# Patient Record
Sex: FEMALE | Race: ASIAN | NOT HISPANIC OR LATINO | URBAN - METROPOLITAN AREA
[De-identification: names, ages, dates, MRNs, and addresses within clinical notes are randomized per-mention and may not be internally consistent; named-entity substitution may affect disease eponyms.]

---

## 2019-06-20 ENCOUNTER — EMERGENCY (EMERGENCY)
Facility: HOSPITAL | Age: 29
LOS: 1 days | Discharge: HOME | End: 2019-06-20
Attending: EMERGENCY MEDICINE
Payer: MEDICAID

## 2019-06-20 VITALS
HEIGHT: 58 IN | DIASTOLIC BLOOD PRESSURE: 88 MMHG | TEMPERATURE: 98 F | HEART RATE: 77 BPM | SYSTOLIC BLOOD PRESSURE: 124 MMHG | RESPIRATION RATE: 16 BRPM | WEIGHT: 153 LBS | OXYGEN SATURATION: 100 %

## 2019-06-20 VITALS
OXYGEN SATURATION: 100 % | DIASTOLIC BLOOD PRESSURE: 88 MMHG | RESPIRATION RATE: 16 BRPM | TEMPERATURE: 97 F | HEART RATE: 67 BPM | SYSTOLIC BLOOD PRESSURE: 134 MMHG

## 2019-06-20 LAB
ALBUMIN SERPL ELPH-MCNC: 4 G/DL — SIGNIFICANT CHANGE UP (ref 3.5–5)
ALP SERPL-CCNC: 74 U/L — SIGNIFICANT CHANGE UP (ref 40–120)
ALT FLD-CCNC: 18 U/L DA — SIGNIFICANT CHANGE UP (ref 10–60)
ANION GAP SERPL CALC-SCNC: 8 MMOL/L — SIGNIFICANT CHANGE UP (ref 5–17)
AST SERPL-CCNC: 10 U/L — SIGNIFICANT CHANGE UP (ref 10–40)
BASOPHILS # BLD AUTO: 0.06 K/UL — SIGNIFICANT CHANGE UP (ref 0–0.2)
BASOPHILS NFR BLD AUTO: 0.6 % — SIGNIFICANT CHANGE UP (ref 0–2)
BILIRUB SERPL-MCNC: 0.6 MG/DL — SIGNIFICANT CHANGE UP (ref 0.2–1.2)
BUN SERPL-MCNC: 9 MG/DL — SIGNIFICANT CHANGE UP (ref 7–18)
CALCIUM SERPL-MCNC: 8.9 MG/DL — SIGNIFICANT CHANGE UP (ref 8.4–10.5)
CHLORIDE SERPL-SCNC: 106 MMOL/L — SIGNIFICANT CHANGE UP (ref 96–108)
CO2 SERPL-SCNC: 26 MMOL/L — SIGNIFICANT CHANGE UP (ref 22–31)
CREAT SERPL-MCNC: 0.61 MG/DL — SIGNIFICANT CHANGE UP (ref 0.5–1.3)
EOSINOPHIL # BLD AUTO: 0.11 K/UL — SIGNIFICANT CHANGE UP (ref 0–0.5)
EOSINOPHIL NFR BLD AUTO: 1.2 % — SIGNIFICANT CHANGE UP (ref 0–6)
GLUCOSE SERPL-MCNC: 91 MG/DL — SIGNIFICANT CHANGE UP (ref 70–99)
HCG SERPL-ACNC: <1 MIU/ML — SIGNIFICANT CHANGE UP
HCT VFR BLD CALC: 35.9 % — SIGNIFICANT CHANGE UP (ref 34.5–45)
HGB BLD-MCNC: 10.9 G/DL — LOW (ref 11.5–15.5)
IMM GRANULOCYTES NFR BLD AUTO: 0.4 % — SIGNIFICANT CHANGE UP (ref 0–1.5)
LYMPHOCYTES # BLD AUTO: 2.56 K/UL — SIGNIFICANT CHANGE UP (ref 1–3.3)
LYMPHOCYTES # BLD AUTO: 27.1 % — SIGNIFICANT CHANGE UP (ref 13–44)
MCHC RBC-ENTMCNC: 18.8 PG — LOW (ref 27–34)
MCHC RBC-ENTMCNC: 30.4 GM/DL — LOW (ref 32–36)
MCV RBC AUTO: 62 FL — LOW (ref 80–100)
MONOCYTES # BLD AUTO: 0.51 K/UL — SIGNIFICANT CHANGE UP (ref 0–0.9)
MONOCYTES NFR BLD AUTO: 5.4 % — SIGNIFICANT CHANGE UP (ref 2–14)
NEUTROPHILS # BLD AUTO: 6.16 K/UL — SIGNIFICANT CHANGE UP (ref 1.8–7.4)
NEUTROPHILS NFR BLD AUTO: 65.3 % — SIGNIFICANT CHANGE UP (ref 43–77)
NRBC # BLD: 0 /100 WBCS — SIGNIFICANT CHANGE UP (ref 0–0)
PLATELET # BLD AUTO: 482 K/UL — HIGH (ref 150–400)
POTASSIUM SERPL-MCNC: 3.3 MMOL/L — LOW (ref 3.5–5.3)
POTASSIUM SERPL-SCNC: 3.3 MMOL/L — LOW (ref 3.5–5.3)
PROT SERPL-MCNC: 8 G/DL — SIGNIFICANT CHANGE UP (ref 6–8.3)
RBC # BLD: 5.79 M/UL — HIGH (ref 3.8–5.2)
RBC # FLD: 17 % — HIGH (ref 10.3–14.5)
SODIUM SERPL-SCNC: 140 MMOL/L — SIGNIFICANT CHANGE UP (ref 135–145)
TROPONIN I SERPL-MCNC: <0.015 NG/ML — SIGNIFICANT CHANGE UP (ref 0–0.04)
WBC # BLD: 9.44 K/UL — SIGNIFICANT CHANGE UP (ref 3.8–10.5)
WBC # FLD AUTO: 9.44 K/UL — SIGNIFICANT CHANGE UP (ref 3.8–10.5)

## 2019-06-20 PROCEDURE — 85027 COMPLETE CBC AUTOMATED: CPT

## 2019-06-20 PROCEDURE — 80053 COMPREHEN METABOLIC PANEL: CPT

## 2019-06-20 PROCEDURE — 36415 COLL VENOUS BLD VENIPUNCTURE: CPT

## 2019-06-20 PROCEDURE — 84484 ASSAY OF TROPONIN QUANT: CPT

## 2019-06-20 PROCEDURE — 96374 THER/PROPH/DIAG INJ IV PUSH: CPT

## 2019-06-20 PROCEDURE — 71045 X-RAY EXAM CHEST 1 VIEW: CPT

## 2019-06-20 PROCEDURE — 99285 EMERGENCY DEPT VISIT HI MDM: CPT

## 2019-06-20 PROCEDURE — 99284 EMERGENCY DEPT VISIT MOD MDM: CPT | Mod: 25

## 2019-06-20 PROCEDURE — 71045 X-RAY EXAM CHEST 1 VIEW: CPT | Mod: 26

## 2019-06-20 PROCEDURE — 84702 CHORIONIC GONADOTROPIN TEST: CPT

## 2019-06-20 RX ORDER — METOCLOPRAMIDE HCL 10 MG
10 TABLET ORAL ONCE
Refills: 0 | Status: COMPLETED | OUTPATIENT
Start: 2019-06-20 | End: 2019-06-20

## 2019-06-20 RX ADMIN — Medication 10 MILLIGRAM(S): at 13:44

## 2019-06-20 NOTE — ED PROVIDER NOTE - CARE PROVIDER_API CALL
Jael Stone (MD)  Clinical Neurophysiology; Neurology  [address] [facility], [address] [place] [address]  Phone: [phone]  Fax: [phone]  Follow Up Time: Routine

## 2019-06-20 NOTE — ED PROVIDER NOTE - CARE PLAN
Principal Discharge DX:	Nonintractable episodic headache, unspecified headache type  Secondary Diagnosis:	Beta thalassemia trait  Secondary Diagnosis:	Anxiety about health

## 2019-06-20 NOTE — ED PROVIDER NOTE - OBJECTIVE STATEMENT
Pt is a 27 y/o F presenting to the ED with c/o multiple medical complaints. Pt reports waking up with a headache located at the top of her head. She states it feels like the typical headaches she gets intermittently, but she is still feeling them currently. Pt also states she gradually developed a heaviness in her chest, feeling like she is unable to catch a full breath, and had a tingling in her face. She reports all her sxs were fairly persistent prompting her visit to [facility] urgent care. Had a full work up including lab work and CXR which was negative, and pt sent to the ED for further eval. Denies recent fevers. No head trauma or falls. Pt admits to being under significant amounts of stress over the last few months with her daughter having sepsis 2 months ago and her sister was recently diagnosed with leukemia in the last week.

## 2019-06-20 NOTE — ED ADULT TRIAGE NOTE - CHIEF COMPLAINT QUOTE
BIBA from  for SOB and numbness of b/l upper and lower extremities s/p receiving news of sister having CA. 20G left arm from .

## 2019-06-20 NOTE — ED PROVIDER NOTE - CLINICAL SUMMARY MEDICAL DECISION MAKING FREE TEXT BOX
Reviewed labs from OP clinic that were normal. Will do CXR, EKG, and symptomatic treatment. Symptoms not consistent with SAH or meningitis. Pt has not seen neurology, will refer.

## 2019-06-20 NOTE — ED ADULT NURSE NOTE - OBJECTIVE STATEMENT
Patient came to the ED a/o x 3 ambulates c/o shortness of breath with no respiratory distress. Pt sent from urgent care for evaluation.

## 2019-08-15 NOTE — ED PROVIDER NOTE - PROGRESS NOTE DETAILS
reviewed results w pt, feeling better - headache gone, requesting discharge, advised return precautions. unable to assess. Pt A&Ox1

## 2022-03-04 NOTE — ED PROVIDER NOTE - ATTESTATION, MLM
I have reviewed and confirmed nurses' notes for patient's medications, allergies, medical history, and surgical history.
Esther

## 2022-04-27 ENCOUNTER — APPOINTMENT (OUTPATIENT)
Dept: OBGYN | Facility: CLINIC | Age: 32
End: 2022-04-27

## 2022-08-24 ENCOUNTER — OUTPATIENT (OUTPATIENT)
Dept: OUTPATIENT SERVICES | Facility: HOSPITAL | Age: 32
LOS: 1 days | End: 2022-08-24
Payer: MEDICAID

## 2022-08-24 DIAGNOSIS — Z3A.00 WEEKS OF GESTATION OF PREGNANCY NOT SPECIFIED: ICD-10-CM

## 2022-08-24 DIAGNOSIS — O26.899 OTHER SPECIFIED PREGNANCY RELATED CONDITIONS, UNSPECIFIED TRIMESTER: ICD-10-CM

## 2022-08-24 LAB
APPEARANCE UR: CLEAR — SIGNIFICANT CHANGE UP
BILIRUB UR-MCNC: NEGATIVE — SIGNIFICANT CHANGE UP
COLOR SPEC: YELLOW — SIGNIFICANT CHANGE UP
DIFF PNL FLD: NEGATIVE — SIGNIFICANT CHANGE UP
GLUCOSE UR QL: NEGATIVE — SIGNIFICANT CHANGE UP
KETONES UR-MCNC: NEGATIVE — SIGNIFICANT CHANGE UP
LEUKOCYTE ESTERASE UR-ACNC: ABNORMAL
NITRITE UR-MCNC: NEGATIVE — SIGNIFICANT CHANGE UP
PH UR: 7 — SIGNIFICANT CHANGE UP (ref 5–8)
PROT UR-MCNC: NEGATIVE — SIGNIFICANT CHANGE UP
SP GR SPEC: 1.01 — SIGNIFICANT CHANGE UP (ref 1.01–1.02)
UROBILINOGEN FLD QL: NEGATIVE — SIGNIFICANT CHANGE UP

## 2022-08-24 PROCEDURE — 59025 FETAL NON-STRESS TEST: CPT | Mod: 26

## 2022-08-24 PROCEDURE — 59025 FETAL NON-STRESS TEST: CPT

## 2022-08-24 PROCEDURE — 81001 URINALYSIS AUTO W/SCOPE: CPT

## 2022-08-24 PROCEDURE — 87086 URINE CULTURE/COLONY COUNT: CPT

## 2022-08-24 PROCEDURE — G0463: CPT

## 2022-08-24 RX ORDER — FAMOTIDINE 10 MG/ML
1 INJECTION INTRAVENOUS
Qty: 30 | Refills: 0
Start: 2022-08-24

## 2022-08-24 RX ORDER — INDOMETHACIN 50 MG
50 CAPSULE ORAL ONCE
Refills: 0 | Status: DISCONTINUED | OUTPATIENT
Start: 2022-08-24 | End: 2022-09-07

## 2022-08-24 RX ORDER — INDOMETHACIN 50 MG
1 CAPSULE ORAL
Qty: 12 | Refills: 0
Start: 2022-08-24 | End: 2022-08-26

## 2022-08-24 RX ORDER — FAMOTIDINE 10 MG/ML
20 INJECTION INTRAVENOUS DAILY
Refills: 0 | Status: DISCONTINUED | OUTPATIENT
Start: 2022-08-24 | End: 2022-09-07

## 2022-08-24 RX ORDER — ACETAMINOPHEN 500 MG
975 TABLET ORAL ONCE
Refills: 0 | Status: DISCONTINUED | OUTPATIENT
Start: 2022-08-24 | End: 2022-09-07

## 2022-08-25 LAB
CULTURE RESULTS: SIGNIFICANT CHANGE UP
SPECIMEN SOURCE: SIGNIFICANT CHANGE UP

## 2022-08-29 PROBLEM — Z00.00 ENCOUNTER FOR PREVENTIVE HEALTH EXAMINATION: Status: ACTIVE | Noted: 2022-08-29

## 2022-09-01 ENCOUNTER — APPOINTMENT (OUTPATIENT)
Dept: ANTEPARTUM | Facility: CLINIC | Age: 32
End: 2022-09-01

## 2022-09-01 ENCOUNTER — APPOINTMENT (OUTPATIENT)
Dept: MATERNAL FETAL MEDICINE | Facility: CLINIC | Age: 32
End: 2022-09-01

## 2022-09-01 ENCOUNTER — ASOB RESULT (OUTPATIENT)
Age: 32
End: 2022-09-01

## 2022-09-01 VITALS
RESPIRATION RATE: 16 BRPM | BODY MASS INDEX: 31.91 KG/M2 | DIASTOLIC BLOOD PRESSURE: 74 MMHG | HEART RATE: 90 BPM | HEIGHT: 58 IN | SYSTOLIC BLOOD PRESSURE: 128 MMHG | WEIGHT: 152 LBS | OXYGEN SATURATION: 98 %

## 2022-09-01 DIAGNOSIS — Z78.9 OTHER SPECIFIED HEALTH STATUS: ICD-10-CM

## 2022-09-01 DIAGNOSIS — O47.00 FALSE LABOR BEFORE 37 COMPLETED WEEKS OF GESTATION, UNSPECIFIED TRIMESTER: ICD-10-CM

## 2022-09-01 DIAGNOSIS — D21.9 BENIGN NEOPLASM OF CONNECTIVE AND OTHER SOFT TISSUE, UNSPECIFIED: ICD-10-CM

## 2022-09-01 DIAGNOSIS — O99.012 ANEMIA COMPLICATING PREGNANCY, SECOND TRIMESTER: ICD-10-CM

## 2022-09-01 DIAGNOSIS — Z83.49 FAMILY HISTORY OF OTHER ENDOCRINE, NUTRITIONAL AND METABOLIC DISEASES: ICD-10-CM

## 2022-09-01 DIAGNOSIS — E03.9 ENDOCRINE, NUTRITIONAL AND METABOLIC DISEASES COMPLICATING PREGNANCY, SECOND TRIMESTER: ICD-10-CM

## 2022-09-01 DIAGNOSIS — Z82.49 FAMILY HISTORY OF ISCHEMIC HEART DISEASE AND OTHER DISEASES OF THE CIRCULATORY SYSTEM: ICD-10-CM

## 2022-09-01 DIAGNOSIS — O99.282 ENDOCRINE, NUTRITIONAL AND METABOLIC DISEASES COMPLICATING PREGNANCY, SECOND TRIMESTER: ICD-10-CM

## 2022-09-01 DIAGNOSIS — Z86.39 PERSONAL HISTORY OF OTHER ENDOCRINE, NUTRITIONAL AND METABOLIC DISEASE: ICD-10-CM

## 2022-09-01 DIAGNOSIS — Z83.3 FAMILY HISTORY OF DIABETES MELLITUS: ICD-10-CM

## 2022-09-01 DIAGNOSIS — D56.3 THALASSEMIA MINOR: ICD-10-CM

## 2022-09-01 PROCEDURE — 76817 TRANSVAGINAL US OBSTETRIC: CPT

## 2022-09-01 PROCEDURE — 99204 OFFICE O/P NEW MOD 45 MIN: CPT | Mod: TH

## 2022-09-01 RX ORDER — FERROUS SULFATE 325(65) MG
325 (65 FE) TABLET ORAL TWICE DAILY
Refills: 0 | Status: ACTIVE | COMMUNITY

## 2022-09-01 RX ORDER — LEVOTHYROXINE SODIUM 0.05 MG/1
50 TABLET ORAL
Refills: 0 | Status: ACTIVE | COMMUNITY

## 2022-09-01 NOTE — DISCUSSION/SUMMARY
Mom notified of above and agrees with plan. Will call in 2 to 3 weeks with an update. Please send to pharmacy. [FreeTextEntry1] : COntinue PO Hydration\par \par Growth sonogram in 2 weeks, then every 4 weeks. \par \par Weekly fetal testing to begin at 34-36 weeks.

## 2022-09-01 NOTE — DATA REVIEWED
[FreeTextEntry1] : Sonogram today shows good fetal movement with the cervix apearing long and closed measuring 4.8cm\par \par She was recently evaluated in the hospital for  contractions, however from her hisotry it may be more consistent with a degenerating myoma, as it responded well to a short course of indocin.  Since then, she has not had any recurrence of the pains. \par \par Her home blood pressure reading have remained stable at the 110-120/70's range and she denies any headache or visual changes. BP here today is 128/74\par \par We discussed the need for serial growth sonogram due to increased incidence of IUGR with chronic hypertension.

## 2022-09-16 ENCOUNTER — ASOB RESULT (OUTPATIENT)
Age: 32
End: 2022-09-16

## 2022-09-16 ENCOUNTER — APPOINTMENT (OUTPATIENT)
Dept: ANTEPARTUM | Facility: CLINIC | Age: 32
End: 2022-09-16

## 2022-09-16 PROCEDURE — 76816 OB US FOLLOW-UP PER FETUS: CPT

## 2022-10-11 ENCOUNTER — OUTPATIENT (OUTPATIENT)
Dept: INPATIENT UNIT | Facility: HOSPITAL | Age: 32
LOS: 1 days | Discharge: HOME | End: 2022-10-11

## 2022-10-11 ENCOUNTER — TRANSCRIPTION ENCOUNTER (OUTPATIENT)
Age: 32
End: 2022-10-11

## 2022-10-11 VITALS
TEMPERATURE: 98 F | DIASTOLIC BLOOD PRESSURE: 71 MMHG | HEART RATE: 86 BPM | RESPIRATION RATE: 14 BRPM | SYSTOLIC BLOOD PRESSURE: 98 MMHG

## 2022-10-11 VITALS — DIASTOLIC BLOOD PRESSURE: 79 MMHG | HEART RATE: 77 BPM | SYSTOLIC BLOOD PRESSURE: 124 MMHG

## 2022-10-11 DIAGNOSIS — Z3A.00 WEEKS OF GESTATION OF PREGNANCY NOT SPECIFIED: ICD-10-CM

## 2022-10-11 DIAGNOSIS — O26.899 OTHER SPECIFIED PREGNANCY RELATED CONDITIONS, UNSPECIFIED TRIMESTER: ICD-10-CM

## 2022-10-11 LAB
APPEARANCE UR: ABNORMAL
BACTERIA # UR AUTO: NEGATIVE — SIGNIFICANT CHANGE UP
BILIRUB UR-MCNC: NEGATIVE — SIGNIFICANT CHANGE UP
COLOR SPEC: YELLOW — SIGNIFICANT CHANGE UP
DIFF PNL FLD: NEGATIVE — SIGNIFICANT CHANGE UP
EPI CELLS # UR: 2 /HPF — SIGNIFICANT CHANGE UP (ref 0–5)
GLUCOSE UR QL: NEGATIVE — SIGNIFICANT CHANGE UP
HYALINE CASTS # UR AUTO: 1 /LPF — SIGNIFICANT CHANGE UP (ref 0–7)
KETONES UR-MCNC: NEGATIVE — SIGNIFICANT CHANGE UP
LEUKOCYTE ESTERASE UR-ACNC: NEGATIVE — SIGNIFICANT CHANGE UP
NITRITE UR-MCNC: NEGATIVE — SIGNIFICANT CHANGE UP
PH UR: 8 — SIGNIFICANT CHANGE UP (ref 5–8)
PROT UR-MCNC: ABNORMAL
RBC CASTS # UR COMP ASSIST: 3 /HPF — SIGNIFICANT CHANGE UP (ref 0–4)
SP GR SPEC: 1.02 — SIGNIFICANT CHANGE UP (ref 1.01–1.05)
UROBILINOGEN FLD QL: SIGNIFICANT CHANGE UP
WBC UR QL: 5 /HPF — SIGNIFICANT CHANGE UP (ref 0–5)

## 2022-10-11 PROCEDURE — 76818 FETAL BIOPHYS PROFILE W/NST: CPT | Mod: 26

## 2022-10-11 PROCEDURE — 76817 TRANSVAGINAL US OBSTETRIC: CPT | Mod: 26

## 2022-10-11 RX ORDER — INDOMETHACIN 50 MG
1 CAPSULE ORAL
Qty: 8 | Refills: 0
Start: 2022-10-11 | End: 2022-10-12

## 2022-10-11 RX ORDER — INDOMETHACIN 50 MG
25 CAPSULE ORAL ONCE
Refills: 0 | Status: COMPLETED | OUTPATIENT
Start: 2022-10-11 | End: 2022-10-11

## 2022-10-11 RX ADMIN — Medication 25 MILLIGRAM(S): at 14:08

## 2022-10-11 NOTE — CONSULT NOTE ADULT - SUBJECTIVE AND OBJECTIVE BOX
[facility] Note    Ms. Shepard was seen and evaluated at bedside. She is feeling overall well. She has cramping abdominal pain which is consistent with her previously experienced fibroid pain. SHe rates it has a 4/10 and states that it wraps around to her back. She denies any vaginal bleeding, loss of fluid or decreased fetal movement. She has had increased pelvic pressure throughout the past 2 weeks, worse with prolonged standing. No fevers, chills, chest pain, shortness of breath, dizziness or lightheadedness. No Constipation, diarrhea. No headaches, vision changes, right upper quadrant pain. She was admitted to [facility] at 23 weeks gestation with similar symptoms, improved s/p 48 hour course of indomethacin.    Past Medical History: Hypothyroidism, chronic hypertension, migraines  Past Surgical HIstory: CD x1 in   Past OBGYN: , hx of fibroid uterus- was not symptomatic prior to pregnancy. On last [facility] sonogram patient with 4 fibroids (4x3x3, 6x3x6, 8x6x7, 4x4x4).   2015 CD x1: underwent induction of labor for oligohydramnios, developed intramniotic infection, had a  delivery for a failed induction.   Medications: Levothyroxine, Labetalol 200 mg BID which was started at the beginning of the pregnancy.   Social Hx: Denies alcohol, tobacco or drug use    Vital Signs Last 24 Hrs  T(C): 36.8 (10-11-22 @ 12:04), Max: 36.8 (10-11-22 @ 12:04)  T(F): 98.24 (10-11-22 @ 12:04), Max: 98.24 (10-11-22 @ 12:04)  HR: 72 (10-11-22 @ 14:52) (67 - 86)  BP: 130/83 (10-11-22 @ 14:52) (98/71 - 130/83)  BP(mean): --  RR: 17 (10-11-22 @ 12:04) (14 - 17)  SpO2: --    General: NAD  REsp: unlabored  Abd: Soft, nontender, gravid, no rebound or guarding  FHR: 140 bl, mod hakan, + accels, - decel  Calhan: irritability, contractions q 5-7 min  VE: Closed/Long/High  Ultrasound with BPP 8/8. Cervical Length 4.1-4.5 cm without dynamic changes.

## 2022-10-11 NOTE — CONSULT NOTE ADULT - ASSESSMENT
32 year old  at 29 weeks 6 days gestation presenting with  contractions likely secondary to fibroid pain, clinically stable at this time    - Patient is hemodynamically stable with normal vital signs and benign physical exam  - Patient is having contractions on the tocometer, CL is long, cervix is closed. Low suspicion for  labor.   - Patient given 25 mg of Indomethacin approximately 45 minutes ago and reported symptomatic improvement.   - Patient to continue 48 hour course of indomethacin 25 mg q 6 Hrs. SHe should follow up with her doctor within the week. Consider an additional 24 hours of indomethacin if persistent symptoms. Patient counseled on risks of prolonged NSAID exposure on fetal renal status with oligohydramnios. SHe expressed understanding.   - History of chronic hypertension, no s/sx of preeclampsia  - Patient call or return with worsening pain, loss of fluid, vaginal bleeding or decreased fetal movement. Discussed additional conservative management including belly band, warm packs, partner massage. Also patient may take tylenol which is safe in pregnancy.     Plan of care discussed with Dr. Mcfarland. Reviewed plan of care with patient and partner.   Rachel Khan MD  Fellow, Maternal Fetal Medicine

## 2022-10-11 NOTE — OB PROVIDER TRIAGE NOTE - NSHPPHYSICALEXAM_GEN_ALL_CORE
T(C): 36.8 (10-11-22 @ 12:04), Max: 36.8 (10-11-22 @ 12:04)  HR: 72 (10-11-22 @ 14:52) (67 - 86)  BP: 130/83 (10-11-22 @ 14:52) (98/71 - 130/83)  RR: 17 (10-11-22 @ 12:04) (14 - 17)  SpO2: --  General: Female sitting comfortably   Head: Normocephalic. Atraumatic.   Eyes: No discharge, lids normal, conjunctiva normal  Lungs: No resp distress  Abdomen: Soft, nontender. Gravid.   SSE: No erythema, edema, lesions, blood, or leaking fluid at external genitalia. Cervix open / closed. Blood absent / present. Discharge  SVE: No erythema, edema, lesions, blood, or leaking fluid at external genitalia.   NST: bpm. Variability. Category. Tocometer   TAUS: Vertex. Done to confirm presentation   Neuro: No facial asymmetry, no slurred speech, moves all 4 extremities  Mood: Alert and lucid, appropriate mood and affect T(C): 36.8 (10-11-22 @ 12:04), Max: 36.8 (10-11-22 @ 12:04)  HR: 72 (10-11-22 @ 14:52) (67 - 86)  BP: 130/83 (10-11-22 @ 14:52) (98/71 - 130/83)  RR: 17 (10-11-22 @ 12:04) (14 - 17)  SpO2: --  General: Female sitting comfortably   Head: Normocephalic. Atraumatic.   Eyes: No discharge, lids normal, conjunctiva normal  Lungs: No resp distress  Abdomen: Soft, nontender. Gravid.   SVE: No erythema, edema, lesions, blood, or leaking fluid at external genitalia. Cervix closed and longed. Performed by Dr. Khan  NST: 135 bpm. Moderate variability. 10x10 accelerations present. Category 1. Tocometer with irregular contractions   TAUS: Vertex. BPP 8/8. SONI 18.61.   TVUS: CL: 4.15-4.64. No dynamic changes or funneling.                                                                                                    Neuro: No facial asymmetry, no slurred speech, moves all 4 extremities  Mood: Alert and lucid, appropriate mood and affect

## 2022-10-11 NOTE — OB PROVIDER TRIAGE NOTE - NS_OBGYNHISTORY_OBGYN_ALL_OB_FT
seen in [place] at 23wk for fibroid pain, Indocin x 3 days  C/s failed oligo induction 10/25/2015 F 6#10  Fibroids

## 2022-10-11 NOTE — OB RN TRIAGE NOTE - NS_OBGYNHISTORY_OBGYN_ALL_OB_FT
C/s failed induction 10/25/2015  Fibroids C/s failed oligo induction 10/25/2015 F 6#10  Fibroids seen in [place] at 23wk for fibroid pain, Indocin x 3 days  C/s failed oligo induction 10/25/2015 F 6#10  Fibroids

## 2022-10-11 NOTE — OB PROVIDER TRIAGE NOTE - NSOBPROVIDERNOTE_OBGYN_ALL_OB_FT
Ms. ALMAZ BAKER is a 32y  29w6d presenting with intermittent cramping abdominal pain since  that started to increase in frequency around 3am to about every 4-5minutes.     - Cervix closed and longed. Performed by Dr. Khan. Category 1. Tocometer with irregular contractions. BPP . SONI 18.61. CL: 4.15-4.64. No dynamic changes or funneling.  - Dr. Cross notified. Recommendation for MFM consultation and indomethacin 25mg once.     - Per MFM recommendation, patient is cleared for discharge home with indomethacin 25mg every 6 hours x 48 hours    Plan  - Patient to be discharged home with follow up and return precautions  - Please follow up with Dr. Cross tomorrow   - Please return for decreased / no fetal movement, vaginal bleeding similar to that of a period, leaking / gush of fluid, regular contractions occurring 4-5 minutes  for one hour or requiring pain medication   - Please take your medications as prescribed.   - Patient and partner and educated of plan and demonstrate understanding. All questions answered. Discharge instructions provided and signed.     Plan d/w Dr. Cross

## 2022-10-11 NOTE — OB PROVIDER TRIAGE NOTE - HISTORY OF PRESENT ILLNESS
Ms. ALMAZ BAKER is a 32y  29w6d presenting with intermittent cramping abdominal pain since  that started to increase in frequency around 3am to about every 4-5minutes. Notes that when they are present they are about 7-8/10. Notes that she was evaluated in [facility] for similar symptoms at about 23w and was found to have large fibroids and given Indocin with significant improvement of symptoms. On last M  sonogram patient with 4 fibroids (4x3x3, 6x3x6, 8x6x7, 4x4x4).   PNC: Denies any other prenatal issues or complications. Pt reports no hospitalizations, procedures, infections, or new diagnoses in pregnancy. Pt denies complications with blood sugar.   No Known Allergies    OBHx:   - 10/25/2015: C/S failed induction   GynHx:  - Denies   PMH:   -cHTN  - Hypothyroidism   PSH:   - Denies   Psych:   - Denies  Social:   - Denies   Meds:   - Levothyroxine 50mcg once daily  - Labetolol 200mg bid

## 2022-10-14 ENCOUNTER — ASOB RESULT (OUTPATIENT)
Age: 32
End: 2022-10-14

## 2022-10-14 ENCOUNTER — APPOINTMENT (OUTPATIENT)
Dept: ANTEPARTUM | Facility: CLINIC | Age: 32
End: 2022-10-14

## 2022-10-14 PROBLEM — I10 ESSENTIAL (PRIMARY) HYPERTENSION: Chronic | Status: ACTIVE | Noted: 2022-10-11

## 2022-10-14 PROBLEM — E03.9 HYPOTHYROIDISM, UNSPECIFIED: Chronic | Status: ACTIVE | Noted: 2022-10-11

## 2022-10-14 PROBLEM — D56.3 THALASSEMIA MINOR: Chronic | Status: ACTIVE | Noted: 2022-10-11

## 2022-10-14 PROCEDURE — 76816 OB US FOLLOW-UP PER FETUS: CPT

## 2022-10-28 ENCOUNTER — APPOINTMENT (OUTPATIENT)
Dept: ANTEPARTUM | Facility: CLINIC | Age: 32
End: 2022-10-28

## 2022-11-04 ENCOUNTER — APPOINTMENT (OUTPATIENT)
Dept: ANTEPARTUM | Facility: CLINIC | Age: 32
End: 2022-11-04

## 2022-11-11 ENCOUNTER — ASOB RESULT (OUTPATIENT)
Age: 32
End: 2022-11-11

## 2022-11-11 ENCOUNTER — APPOINTMENT (OUTPATIENT)
Dept: ANTEPARTUM | Facility: CLINIC | Age: 32
End: 2022-11-11

## 2022-11-11 PROCEDURE — 76820 UMBILICAL ARTERY ECHO: CPT | Mod: 59

## 2022-11-11 PROCEDURE — 76816 OB US FOLLOW-UP PER FETUS: CPT

## 2022-11-11 PROCEDURE — 93976 VASCULAR STUDY: CPT

## 2022-11-12 ENCOUNTER — OUTPATIENT (OUTPATIENT)
Dept: OUTPATIENT SERVICES | Facility: HOSPITAL | Age: 32
LOS: 1 days | End: 2022-11-12
Payer: MEDICAID

## 2022-11-12 DIAGNOSIS — O26.899 OTHER SPECIFIED PREGNANCY RELATED CONDITIONS, UNSPECIFIED TRIMESTER: ICD-10-CM

## 2022-11-12 DIAGNOSIS — Z3A.00 WEEKS OF GESTATION OF PREGNANCY NOT SPECIFIED: ICD-10-CM

## 2022-11-12 PROCEDURE — G0463: CPT

## 2022-11-12 PROCEDURE — 59025 FETAL NON-STRESS TEST: CPT

## 2022-11-12 PROCEDURE — 99284 EMERGENCY DEPT VISIT MOD MDM: CPT

## 2022-11-18 ENCOUNTER — APPOINTMENT (OUTPATIENT)
Dept: ANTEPARTUM | Facility: CLINIC | Age: 32
End: 2022-11-18

## 2022-11-28 ENCOUNTER — APPOINTMENT (OUTPATIENT)
Dept: ANTEPARTUM | Facility: CLINIC | Age: 32
End: 2022-11-28

## 2022-11-30 ENCOUNTER — OUTPATIENT (OUTPATIENT)
Dept: OUTPATIENT SERVICES | Facility: HOSPITAL | Age: 32
LOS: 1 days | End: 2022-11-30

## 2022-11-30 VITALS
OXYGEN SATURATION: 99 % | WEIGHT: 164.02 LBS | SYSTOLIC BLOOD PRESSURE: 124 MMHG | HEIGHT: 57 IN | TEMPERATURE: 98 F | DIASTOLIC BLOOD PRESSURE: 85 MMHG | RESPIRATION RATE: 14 BRPM

## 2022-11-30 DIAGNOSIS — O34.211 MATERNAL CARE FOR LOW TRANSVERSE SCAR FROM PREVIOUS CESAREAN DELIVERY: ICD-10-CM

## 2022-11-30 DIAGNOSIS — Z34.90 ENCOUNTER FOR SUPERVISION OF NORMAL PREGNANCY, UNSPECIFIED, UNSPECIFIED TRIMESTER: ICD-10-CM

## 2022-11-30 LAB
ANION GAP SERPL CALC-SCNC: 13 MMOL/L — SIGNIFICANT CHANGE UP (ref 7–14)
APPEARANCE UR: CLEAR — SIGNIFICANT CHANGE UP
BACTERIA # UR AUTO: ABNORMAL
BILIRUB UR-MCNC: NEGATIVE — SIGNIFICANT CHANGE UP
BUN SERPL-MCNC: 10 MG/DL — SIGNIFICANT CHANGE UP (ref 7–23)
CALCIUM SERPL-MCNC: 9.5 MG/DL — SIGNIFICANT CHANGE UP (ref 8.4–10.5)
CHLORIDE SERPL-SCNC: 102 MMOL/L — SIGNIFICANT CHANGE UP (ref 98–107)
CO2 SERPL-SCNC: 22 MMOL/L — SIGNIFICANT CHANGE UP (ref 22–31)
COLOR SPEC: SIGNIFICANT CHANGE UP
CREAT SERPL-MCNC: 0.58 MG/DL — SIGNIFICANT CHANGE UP (ref 0.5–1.3)
DIFF PNL FLD: NEGATIVE — SIGNIFICANT CHANGE UP
EGFR: 122 ML/MIN/1.73M2 — SIGNIFICANT CHANGE UP
EPI CELLS # UR: 4 /HPF — SIGNIFICANT CHANGE UP (ref 0–5)
GLUCOSE SERPL-MCNC: 82 MG/DL — SIGNIFICANT CHANGE UP (ref 70–99)
GLUCOSE UR QL: NEGATIVE — SIGNIFICANT CHANGE UP
HCT VFR BLD CALC: 30.2 % — LOW (ref 34.5–45)
HGB BLD-MCNC: 9.3 G/DL — LOW (ref 11.5–15.5)
HYALINE CASTS # UR AUTO: 1 /LPF — SIGNIFICANT CHANGE UP (ref 0–7)
KETONES UR-MCNC: NEGATIVE — SIGNIFICANT CHANGE UP
LEUKOCYTE ESTERASE UR-ACNC: ABNORMAL
MCHC RBC-ENTMCNC: 20.5 PG — LOW (ref 27–34)
MCHC RBC-ENTMCNC: 30.8 GM/DL — LOW (ref 32–36)
MCV RBC AUTO: 66.5 FL — LOW (ref 80–100)
NITRITE UR-MCNC: NEGATIVE — SIGNIFICANT CHANGE UP
NRBC # BLD: 0 /100 WBCS — SIGNIFICANT CHANGE UP (ref 0–0)
NRBC # FLD: 0 K/UL — SIGNIFICANT CHANGE UP (ref 0–0)
PH UR: 6.5 — SIGNIFICANT CHANGE UP (ref 5–8)
PLATELET # BLD AUTO: 394 K/UL — SIGNIFICANT CHANGE UP (ref 150–400)
POTASSIUM SERPL-MCNC: 4.3 MMOL/L — SIGNIFICANT CHANGE UP (ref 3.5–5.3)
POTASSIUM SERPL-SCNC: 4.3 MMOL/L — SIGNIFICANT CHANGE UP (ref 3.5–5.3)
PROT UR-MCNC: NEGATIVE — SIGNIFICANT CHANGE UP
RBC # BLD: 4.54 M/UL — SIGNIFICANT CHANGE UP (ref 3.8–5.2)
RBC # FLD: 16.1 % — HIGH (ref 10.3–14.5)
RBC CASTS # UR COMP ASSIST: 2 /HPF — SIGNIFICANT CHANGE UP (ref 0–4)
SODIUM SERPL-SCNC: 137 MMOL/L — SIGNIFICANT CHANGE UP (ref 135–145)
SP GR SPEC: 1.01 — LOW (ref 1.01–1.05)
UROBILINOGEN FLD QL: SIGNIFICANT CHANGE UP
WBC # BLD: 7.78 K/UL — SIGNIFICANT CHANGE UP (ref 3.8–10.5)
WBC # FLD AUTO: 7.78 K/UL — SIGNIFICANT CHANGE UP (ref 3.8–10.5)
WBC UR QL: 39 /HPF — HIGH (ref 0–5)

## 2022-11-30 PROCEDURE — 86077 PHYS BLOOD BANK SERV XMATCH: CPT

## 2022-11-30 PROCEDURE — 93010 ELECTROCARDIOGRAM REPORT: CPT

## 2022-11-30 RX ORDER — SODIUM CHLORIDE 9 MG/ML
1000 INJECTION, SOLUTION INTRAVENOUS
Refills: 0 | Status: DISCONTINUED | OUTPATIENT
Start: 2022-12-05 | End: 2022-12-05

## 2022-11-30 RX ORDER — OXYTOCIN 10 UNIT/ML
333.33 VIAL (ML) INJECTION
Qty: 20 | Refills: 0 | Status: DISCONTINUED | OUTPATIENT
Start: 2022-12-05 | End: 2022-12-05

## 2022-11-30 NOTE — OB PST NOTE - NSHPREVIEWOFSYSTEMS_GEN_ALL_CORE
General: No fever, chills, sweating, anorexia, weight loss or weight gain. No polyphagia, polyurea, polydypsia, malaise, or fatigue    Skin: No rashes, itching, or dryness. No change in size/color of moles. No tumors, brittle nails, pitted nails, or hair loss    Breast: No tenderness, lumps, or nipple discharge      Ophthalmologic: No diplopia, photophobia, lacrimation, blurred Vision , or eye discharge    ENMT Symptoms: No hearing difficulty, ear pain, tinnitus, or vertigo. No sinus symptoms, nasal congestion, nasal   discharge, or nasal obstruction    Respiratory and Thorax: No wheezing, dyspnea, cough, hemoptysis, or pleuritic chest pPain     Cardiovascular: hx htn since 2020. No chest pain, palpitations, dyspnea on exertion, orthopnea, paroxysmal nocturnal dyspnea,   peripheral edema, or claudication    Gastrointestinal: No nausea, vomiting, diarrhea, constipation, change in bowel habits, flatulence, abdominal pain, or melena    Genitourinary/ Pelvis: positive  pregnancy reports good fetal movement  No hematuria, renal colic, or flank pain.  No urine discoloration, incontinence, dysuria, or urinary hesitancy. Normal urinary frequency. No nocturia, abnormal vaginal bleeding, vaginal discharge, spotting, pelvic pain, or vaginal leakage    Musculoskeletal: No arthralgia, arthritis, joint swelling, muscle cramping, muscle weakness, neck pain, arm pain, or leg pain    Neurological: No transient paralysis, weakness, paresthesias, or seizures. No syncope, tremors, vertigo, loss of sensation, difficulty walking, loss of consciousness, hemiparesis, confusion, or facial palsy    Psychiatric: No suicidal ideation, depression, anxiety, insomnia, memory loss, paranoia, mood swings, agitation, hallucinations, or hyperactivity    Hematology: No gum bleeding, nose bleeding, or skin lumps    Lymphatic: No enlarged or tender lymph nodes. No extremity swelling    Endocrine: No heat or cold intolerance    Immunologic: No recurrent or persistent infections General: No fever, chills, sweating, anorexia, weight loss or weight gain. No polyphagia, polyurea, polydypsia, malaise, or fatigue    Skin: No rashes, itching, or dryness. No change in size/color of moles. No tumors, brittle nails, pitted nails, or hair loss    Breast: No tenderness, lumps, or nipple discharge      Ophthalmologic: No diplopia, photophobia, lacrimation, blurred Vision , or eye discharge    ENMT Symptoms: No hearing difficulty, ear pain, tinnitus, or vertigo. No sinus symptoms, nasal congestion, nasal   discharge, or nasal obstruction    Respiratory and Thorax: No wheezing, dyspnea, cough, hemoptysis, or pleuritic chest pPain     Cardiovascular: hx htn since 2020. No chest pain, palpitations, dyspnea on exertion, orthopnea, paroxysmal nocturnal dyspnea,   peripheral edema, or claudication    Gastrointestinal: No nausea, vomiting, diarrhea, constipation, change in bowel habits, flatulence, abdominal pain, or melena    Genitourinary/ Pelvis: positive  pregnancy reports good fetal movement  No hematuria, renal colic, or flank pain.  No urine discoloration, incontinence, dysuria, or urinary hesitancy. Normal urinary frequency. No nocturia, abnormal vaginal bleeding, vaginal discharge, spotting, pelvic pain, or vaginal leakage    Musculoskeletal: No arthralgia, arthritis, joint swelling, muscle cramping, muscle weakness, neck pain, arm pain, or leg pain    Neurological: No transient paralysis, weakness, paresthesias, or seizures. No syncope, tremors, vertigo, loss of sensation, difficulty walking, loss of consciousness, hemiparesis, confusion, or facial palsy    Psychiatric: No suicidal ideation, depression, anxiety, insomnia, memory loss, paranoia, mood swings, agitation, hallucinations, or hyperactivity    Hematology: No gum bleeding, nose bleeding, or skin lumps    Lymphatic: No enlarged or tender lymph nodes. No extremity swelling    Endocrine: hypothyroidism denies recent change in Levothyroxine No heat or cold intolerance    Immunologic: No recurrent or persistent infections

## 2022-11-30 NOTE — OB PST NOTE - PROBLEM SELECTOR PLAN 1
Pt scheduled for repeat  section on 2022.  labs done results pending, ekg done.  Hibiclens provided-  written and verbal instructions given, pt able to verbalize understanding.  Preop teaching done, pt able to verbalize understanding.   covid- [facility], instructed to bring a copy of results to the hospital, to notify surgeon prior to c- section if needs to repeat covid testing.    medications prior to procedure as per ob/gyn

## 2022-11-30 NOTE — OB PST NOTE - NSICDXPASTMEDICALHX_GEN_ALL_CORE_FT
PAST MEDICAL HISTORY:  Beta thalassemia trait     Hypertension     Hypothyroid      PAST MEDICAL HISTORY:  Anemia     Beta thalassemia trait     Fibroids     Hypertension     Hypothyroid     Pregnancy

## 2022-11-30 NOTE — OB PST NOTE - HISTORY OF PRESENT ILLNESS
31y/o female scheduled for repeat  section on 2022.  Asper scheduled  worksheet having repeat .  Pt reports good fetal movement, currently 37 weeks.   Developed htn  has been on medications since than.  Hx of thalassemia trait, fibroids, received 3 iron infusions last one week ago.   Pt reports tested positive for covid 2022 for covid, had nasal congestion and body aches for 2 days, was tested at [address].  31y/o female scheduled for repeat  section on 2022.  Asper scheduled  worksheet having repeat .  Pt reports good fetal movement, currently 37 weeks.   Developed htn  has been on medications since than.  Hx of thalassemia trait, fibroids, received 3 iron infusions last one week ago.  As per pt last hgb 8.    Pt reports tested positive for covid 2022 for covid, had nasal congestion and body aches for 2 days, was tested at [address].

## 2022-11-30 NOTE — OB PST NOTE - NSHPPHYSICALEXAM_GEN_ALL_CORE

## 2022-12-01 ENCOUNTER — NON-APPOINTMENT (OUTPATIENT)
Age: 32
End: 2022-12-01

## 2022-12-01 LAB
BLD GP AB SCN SERPL QL: POSITIVE — SIGNIFICANT CHANGE UP
RH IG SCN BLD-IMP: POSITIVE — SIGNIFICANT CHANGE UP

## 2022-12-04 ENCOUNTER — TRANSCRIPTION ENCOUNTER (OUTPATIENT)
Age: 32
End: 2022-12-04

## 2022-12-04 RX ORDER — LABETALOL HCL 100 MG
200 TABLET ORAL EVERY 12 HOURS
Refills: 0 | Status: DISCONTINUED | OUTPATIENT
Start: 2022-12-05 | End: 2022-12-08

## 2022-12-04 RX ORDER — LEVOTHYROXINE SODIUM 125 MCG
50 TABLET ORAL DAILY
Refills: 0 | Status: DISCONTINUED | OUTPATIENT
Start: 2022-12-05 | End: 2022-12-08

## 2022-12-05 ENCOUNTER — INPATIENT (INPATIENT)
Facility: HOSPITAL | Age: 32
LOS: 2 days | Discharge: HOME | End: 2022-12-08
Attending: OBSTETRICS & GYNECOLOGY | Admitting: OBSTETRICS & GYNECOLOGY

## 2022-12-05 VITALS
DIASTOLIC BLOOD PRESSURE: 74 MMHG | RESPIRATION RATE: 16 BRPM | SYSTOLIC BLOOD PRESSURE: 117 MMHG | HEIGHT: 58 IN | HEART RATE: 64 BPM | WEIGHT: 164.02 LBS | TEMPERATURE: 98 F

## 2022-12-05 DIAGNOSIS — O34.211 MATERNAL CARE FOR LOW TRANSVERSE SCAR FROM PREVIOUS CESAREAN DELIVERY: ICD-10-CM

## 2022-12-05 LAB
ALBUMIN SERPL ELPH-MCNC: 3.4 G/DL — SIGNIFICANT CHANGE UP (ref 3.3–5)
ALP SERPL-CCNC: 55 U/L — SIGNIFICANT CHANGE UP (ref 40–120)
ALT FLD-CCNC: 7 U/L — SIGNIFICANT CHANGE UP (ref 4–33)
ANION GAP SERPL CALC-SCNC: 10 MMOL/L — SIGNIFICANT CHANGE UP (ref 7–14)
APTT BLD: 25.8 SEC — LOW (ref 27–36.3)
AST SERPL-CCNC: 13 U/L — SIGNIFICANT CHANGE UP (ref 4–32)
BASE EXCESS BLDV CALC-SCNC: -3.3 MMOL/L — LOW (ref -2–3)
BASOPHILS # BLD AUTO: 0.03 K/UL — SIGNIFICANT CHANGE UP (ref 0–0.2)
BASOPHILS NFR BLD AUTO: 0.4 % — SIGNIFICANT CHANGE UP (ref 0–2)
BILIRUB SERPL-MCNC: 0.4 MG/DL — SIGNIFICANT CHANGE UP (ref 0.2–1.2)
BLD GP AB SCN SERPL QL: POSITIVE — SIGNIFICANT CHANGE UP
BLOOD GAS VENOUS COMPREHENSIVE RESULT: SIGNIFICANT CHANGE UP
BUN SERPL-MCNC: 6 MG/DL — LOW (ref 7–23)
CALCIUM SERPL-MCNC: 9 MG/DL — SIGNIFICANT CHANGE UP (ref 8.4–10.5)
CHLORIDE BLDV-SCNC: 106 MMOL/L — SIGNIFICANT CHANGE UP (ref 96–108)
CHLORIDE SERPL-SCNC: 103 MMOL/L — SIGNIFICANT CHANGE UP (ref 98–107)
CO2 BLDV-SCNC: 21.9 MMOL/L — LOW (ref 22–26)
CO2 SERPL-SCNC: 20 MMOL/L — LOW (ref 22–31)
COVID-19 SPIKE DOMAIN AB INTERP: POSITIVE
COVID-19 SPIKE DOMAIN ANTIBODY RESULT: >250 U/ML — HIGH
CREAT SERPL-MCNC: 0.45 MG/DL — LOW (ref 0.5–1.3)
EGFR: 129 ML/MIN/1.73M2 — SIGNIFICANT CHANGE UP
EOSINOPHIL # BLD AUTO: 0.16 K/UL — SIGNIFICANT CHANGE UP (ref 0–0.5)
EOSINOPHIL NFR BLD AUTO: 2 % — SIGNIFICANT CHANGE UP (ref 0–6)
FIBRINOGEN PPP-MCNC: 497 MG/DL — HIGH (ref 200–465)
GAS PNL BLDV: 131 MMOL/L — LOW (ref 136–145)
GLUCOSE BLDV-MCNC: 103 MG/DL — HIGH (ref 70–99)
GLUCOSE SERPL-MCNC: 81 MG/DL — SIGNIFICANT CHANGE UP (ref 70–99)
HBV SURFACE AG SERPL QL IA: SIGNIFICANT CHANGE UP
HCO3 BLDV-SCNC: 21 MMOL/L — LOW (ref 22–29)
HCT VFR BLD CALC: 25.3 % — LOW (ref 34.5–45)
HCT VFR BLD CALC: 25.8 % — LOW (ref 34.5–45)
HCT VFR BLD CALC: 28.9 % — LOW (ref 34.5–45)
HCT VFR BLDA CALC: 24 % — LOW (ref 34.5–46.5)
HGB BLD CALC-MCNC: 8.1 G/DL — LOW (ref 11.5–15.5)
HGB BLD-MCNC: 8.1 G/DL — LOW (ref 11.5–15.5)
HGB BLD-MCNC: 8.1 G/DL — LOW (ref 11.5–15.5)
HGB BLD-MCNC: 9 G/DL — LOW (ref 11.5–15.5)
HIV 1+2 AB+HIV1 P24 AG SERPL QL IA: SIGNIFICANT CHANGE UP
IANC: 5.53 K/UL — SIGNIFICANT CHANGE UP (ref 1.8–7.4)
IMM GRANULOCYTES NFR BLD AUTO: 0.5 % — SIGNIFICANT CHANGE UP (ref 0–0.9)
INR BLD: 0.97 RATIO — SIGNIFICANT CHANGE UP (ref 0.88–1.16)
LACTATE BLDV-MCNC: 1.9 MMOL/L — SIGNIFICANT CHANGE UP (ref 0.5–2)
LDH SERPL L TO P-CCNC: 155 U/L — SIGNIFICANT CHANGE UP (ref 135–225)
LYMPHOCYTES # BLD AUTO: 1.69 K/UL — SIGNIFICANT CHANGE UP (ref 1–3.3)
LYMPHOCYTES # BLD AUTO: 20.9 % — SIGNIFICANT CHANGE UP (ref 13–44)
MCHC RBC-ENTMCNC: 20.5 PG — LOW (ref 27–34)
MCHC RBC-ENTMCNC: 20.7 PG — LOW (ref 27–34)
MCHC RBC-ENTMCNC: 21 PG — LOW (ref 27–34)
MCHC RBC-ENTMCNC: 31.1 GM/DL — LOW (ref 32–36)
MCHC RBC-ENTMCNC: 31.4 GM/DL — LOW (ref 32–36)
MCHC RBC-ENTMCNC: 32 GM/DL — SIGNIFICANT CHANGE UP (ref 32–36)
MCV RBC AUTO: 65.2 FL — LOW (ref 80–100)
MCV RBC AUTO: 65.7 FL — LOW (ref 80–100)
MCV RBC AUTO: 66.4 FL — LOW (ref 80–100)
MONOCYTES # BLD AUTO: 0.63 K/UL — SIGNIFICANT CHANGE UP (ref 0–0.9)
MONOCYTES NFR BLD AUTO: 7.8 % — SIGNIFICANT CHANGE UP (ref 2–14)
NEUTROPHILS # BLD AUTO: 5.53 K/UL — SIGNIFICANT CHANGE UP (ref 1.8–7.4)
NEUTROPHILS NFR BLD AUTO: 68.4 % — SIGNIFICANT CHANGE UP (ref 43–77)
NRBC # BLD: 0 /100 WBCS — SIGNIFICANT CHANGE UP (ref 0–0)
NRBC # FLD: 0 K/UL — SIGNIFICANT CHANGE UP (ref 0–0)
NRBC # FLD: 0 K/UL — SIGNIFICANT CHANGE UP (ref 0–0)
NRBC # FLD: 0.02 K/UL — HIGH (ref 0–0)
PCO2 BLDV: 33 MMHG — LOW (ref 39–42)
PH BLDV: 7.41 — SIGNIFICANT CHANGE UP (ref 7.32–7.43)
PLATELET # BLD AUTO: 317 K/UL — SIGNIFICANT CHANGE UP (ref 150–400)
PLATELET # BLD AUTO: 318 K/UL — SIGNIFICANT CHANGE UP (ref 150–400)
PLATELET # BLD AUTO: 328 K/UL — SIGNIFICANT CHANGE UP (ref 150–400)
PO2 BLDV: 79 MMHG — SIGNIFICANT CHANGE UP
POTASSIUM BLDV-SCNC: 4 MMOL/L — SIGNIFICANT CHANGE UP (ref 3.5–5.1)
POTASSIUM SERPL-MCNC: 4.2 MMOL/L — SIGNIFICANT CHANGE UP (ref 3.5–5.3)
POTASSIUM SERPL-SCNC: 4.2 MMOL/L — SIGNIFICANT CHANGE UP (ref 3.5–5.3)
PROT SERPL-MCNC: 6.6 G/DL — SIGNIFICANT CHANGE UP (ref 6–8.3)
PROTHROM AB SERPL-ACNC: 11.2 SEC — SIGNIFICANT CHANGE UP (ref 10.5–13.4)
RBC # BLD: 3.85 M/UL — SIGNIFICANT CHANGE UP (ref 3.8–5.2)
RBC # BLD: 3.96 M/UL — SIGNIFICANT CHANGE UP (ref 3.8–5.2)
RBC # BLD: 4.35 M/UL — SIGNIFICANT CHANGE UP (ref 3.8–5.2)
RBC # FLD: 15.9 % — HIGH (ref 10.3–14.5)
RBC # FLD: 15.9 % — HIGH (ref 10.3–14.5)
RBC # FLD: 16.2 % — HIGH (ref 10.3–14.5)
RH IG SCN BLD-IMP: POSITIVE — SIGNIFICANT CHANGE UP
SAO2 % BLDV: 95.6 % — SIGNIFICANT CHANGE UP
SARS-COV-2 IGG+IGM SERPL QL IA: >250 U/ML — HIGH
SARS-COV-2 IGG+IGM SERPL QL IA: POSITIVE
SODIUM SERPL-SCNC: 133 MMOL/L — LOW (ref 135–145)
URATE SERPL-MCNC: 4 MG/DL — SIGNIFICANT CHANGE UP (ref 2.5–7)
WBC # BLD: 17.39 K/UL — HIGH (ref 3.8–10.5)
WBC # BLD: 17.48 K/UL — HIGH (ref 3.8–10.5)
WBC # BLD: 8.08 K/UL — SIGNIFICANT CHANGE UP (ref 3.8–10.5)
WBC # FLD AUTO: 17.39 K/UL — HIGH (ref 3.8–10.5)
WBC # FLD AUTO: 17.48 K/UL — HIGH (ref 3.8–10.5)
WBC # FLD AUTO: 8.08 K/UL — SIGNIFICANT CHANGE UP (ref 3.8–10.5)

## 2022-12-05 PROCEDURE — 86077 PHYS BLOOD BANK SERV XMATCH: CPT

## 2022-12-05 PROCEDURE — 76815 OB US LIMITED FETUS(S): CPT | Mod: 26

## 2022-12-05 PROCEDURE — 88307 TISSUE EXAM BY PATHOLOGIST: CPT | Mod: 26

## 2022-12-05 DEVICE — SURGICEL SNOW 2 X 4": Type: IMPLANTABLE DEVICE | Status: FUNCTIONAL

## 2022-12-05 DEVICE — SURGICEL FIBRILLAR 1 X 2": Type: IMPLANTABLE DEVICE | Status: FUNCTIONAL

## 2022-12-05 RX ORDER — ACETAMINOPHEN 500 MG
1000 TABLET ORAL ONCE
Refills: 0 | Status: COMPLETED | OUTPATIENT
Start: 2022-12-05 | End: 2022-12-05

## 2022-12-05 RX ORDER — NALBUPHINE HYDROCHLORIDE 10 MG/ML
2.5 INJECTION, SOLUTION INTRAMUSCULAR; INTRAVENOUS; SUBCUTANEOUS EVERY 6 HOURS
Refills: 0 | Status: DISCONTINUED | OUTPATIENT
Start: 2022-12-05 | End: 2022-12-06

## 2022-12-05 RX ORDER — KETOROLAC TROMETHAMINE 30 MG/ML
30 SYRINGE (ML) INJECTION EVERY 6 HOURS
Refills: 0 | Status: DISCONTINUED | OUTPATIENT
Start: 2022-12-05 | End: 2022-12-06

## 2022-12-05 RX ORDER — NALOXONE HYDROCHLORIDE 4 MG/.1ML
0.1 SPRAY NASAL
Refills: 0 | Status: DISCONTINUED | OUTPATIENT
Start: 2022-12-05 | End: 2022-12-06

## 2022-12-05 RX ORDER — SODIUM CHLORIDE 9 MG/ML
1000 INJECTION, SOLUTION INTRAVENOUS
Refills: 0 | Status: DISCONTINUED | OUTPATIENT
Start: 2022-12-05 | End: 2022-12-05

## 2022-12-05 RX ORDER — SODIUM CHLORIDE 9 MG/ML
500 INJECTION, SOLUTION INTRAVENOUS ONCE
Refills: 0 | Status: DISCONTINUED | OUTPATIENT
Start: 2022-12-05 | End: 2022-12-08

## 2022-12-05 RX ORDER — FAMOTIDINE 10 MG/ML
20 INJECTION INTRAVENOUS ONCE
Refills: 0 | Status: COMPLETED | OUTPATIENT
Start: 2022-12-05 | End: 2022-12-05

## 2022-12-05 RX ORDER — MORPHINE SULFATE 50 MG/1
0.1 CAPSULE, EXTENDED RELEASE ORAL ONCE
Refills: 0 | Status: DISCONTINUED | OUTPATIENT
Start: 2022-12-05 | End: 2022-12-05

## 2022-12-05 RX ORDER — SODIUM CHLORIDE 9 MG/ML
1000 INJECTION, SOLUTION INTRAVENOUS ONCE
Refills: 0 | Status: COMPLETED | OUTPATIENT
Start: 2022-12-05 | End: 2022-12-05

## 2022-12-05 RX ORDER — OXYCODONE HYDROCHLORIDE 5 MG/1
5 TABLET ORAL
Refills: 0 | Status: DISCONTINUED | OUTPATIENT
Start: 2022-12-05 | End: 2022-12-08

## 2022-12-05 RX ORDER — IBUPROFEN 200 MG
600 TABLET ORAL EVERY 6 HOURS
Refills: 0 | Status: COMPLETED | OUTPATIENT
Start: 2022-12-05 | End: 2023-11-03

## 2022-12-05 RX ORDER — ACETAMINOPHEN 500 MG
975 TABLET ORAL
Refills: 0 | Status: DISCONTINUED | OUTPATIENT
Start: 2022-12-05 | End: 2022-12-08

## 2022-12-05 RX ORDER — OXYCODONE HYDROCHLORIDE 5 MG/1
5 TABLET ORAL ONCE
Refills: 0 | Status: DISCONTINUED | OUTPATIENT
Start: 2022-12-05 | End: 2022-12-08

## 2022-12-05 RX ORDER — LANOLIN
1 OINTMENT (GRAM) TOPICAL EVERY 6 HOURS
Refills: 0 | Status: DISCONTINUED | OUTPATIENT
Start: 2022-12-05 | End: 2022-12-08

## 2022-12-05 RX ORDER — CITRIC ACID/SODIUM CITRATE 300-500 MG
30 SOLUTION, ORAL ORAL ONCE
Refills: 0 | Status: COMPLETED | OUTPATIENT
Start: 2022-12-05 | End: 2022-12-05

## 2022-12-05 RX ORDER — DIPHENHYDRAMINE HCL 50 MG
25 CAPSULE ORAL EVERY 6 HOURS
Refills: 0 | Status: DISCONTINUED | OUTPATIENT
Start: 2022-12-05 | End: 2022-12-08

## 2022-12-05 RX ORDER — MAGNESIUM HYDROXIDE 400 MG/1
30 TABLET, CHEWABLE ORAL
Refills: 0 | Status: DISCONTINUED | OUTPATIENT
Start: 2022-12-05 | End: 2022-12-08

## 2022-12-05 RX ORDER — ONDANSETRON 8 MG/1
4 TABLET, FILM COATED ORAL EVERY 6 HOURS
Refills: 0 | Status: DISCONTINUED | OUTPATIENT
Start: 2022-12-05 | End: 2022-12-06

## 2022-12-05 RX ORDER — SIMETHICONE 80 MG/1
80 TABLET, CHEWABLE ORAL EVERY 4 HOURS
Refills: 0 | Status: DISCONTINUED | OUTPATIENT
Start: 2022-12-05 | End: 2022-12-08

## 2022-12-05 RX ORDER — INFLUENZA VIRUS VACCINE 15; 15; 15; 15 UG/.5ML; UG/.5ML; UG/.5ML; UG/.5ML
0.5 SUSPENSION INTRAMUSCULAR ONCE
Refills: 0 | Status: DISCONTINUED | OUTPATIENT
Start: 2022-12-05 | End: 2022-12-08

## 2022-12-05 RX ORDER — OXYTOCIN 10 UNIT/ML
333.33 VIAL (ML) INJECTION
Qty: 20 | Refills: 0 | Status: DISCONTINUED | OUTPATIENT
Start: 2022-12-05 | End: 2022-12-05

## 2022-12-05 RX ORDER — HEPARIN SODIUM 5000 [USP'U]/ML
5000 INJECTION INTRAVENOUS; SUBCUTANEOUS EVERY 12 HOURS
Refills: 0 | Status: DISCONTINUED | OUTPATIENT
Start: 2022-12-05 | End: 2022-12-08

## 2022-12-05 RX ORDER — TETANUS TOXOID, REDUCED DIPHTHERIA TOXOID AND ACELLULAR PERTUSSIS VACCINE, ADSORBED 5; 2.5; 8; 8; 2.5 [IU]/.5ML; [IU]/.5ML; UG/.5ML; UG/.5ML; UG/.5ML
0.5 SUSPENSION INTRAMUSCULAR ONCE
Refills: 0 | Status: DISCONTINUED | OUTPATIENT
Start: 2022-12-05 | End: 2022-12-08

## 2022-12-05 RX ORDER — DEXAMETHASONE 0.5 MG/5ML
4 ELIXIR ORAL EVERY 6 HOURS
Refills: 0 | Status: DISCONTINUED | OUTPATIENT
Start: 2022-12-05 | End: 2022-12-06

## 2022-12-05 RX ORDER — SENNA PLUS 8.6 MG/1
2 TABLET ORAL AT BEDTIME
Refills: 0 | Status: DISCONTINUED | OUTPATIENT
Start: 2022-12-05 | End: 2022-12-08

## 2022-12-05 RX ADMIN — SODIUM CHLORIDE 75 MILLILITER(S): 9 INJECTION, SOLUTION INTRAVENOUS at 14:39

## 2022-12-05 RX ADMIN — SODIUM CHLORIDE 75 MILLILITER(S): 9 INJECTION, SOLUTION INTRAVENOUS at 19:45

## 2022-12-05 RX ADMIN — Medication 30 MILLIGRAM(S): at 23:00

## 2022-12-05 RX ADMIN — ONDANSETRON 4 MILLIGRAM(S): 8 TABLET, FILM COATED ORAL at 22:29

## 2022-12-05 RX ADMIN — SENNA PLUS 2 TABLET(S): 8.6 TABLET ORAL at 22:24

## 2022-12-05 RX ADMIN — Medication 1000 MILLIGRAM(S): at 16:30

## 2022-12-05 RX ADMIN — Medication 400 MILLIGRAM(S): at 15:32

## 2022-12-05 RX ADMIN — SODIUM CHLORIDE 75 MILLILITER(S): 9 INJECTION, SOLUTION INTRAVENOUS at 17:54

## 2022-12-05 RX ADMIN — Medication 30 MILLILITER(S): at 09:48

## 2022-12-05 RX ADMIN — HEPARIN SODIUM 5000 UNIT(S): 5000 INJECTION INTRAVENOUS; SUBCUTANEOUS at 22:29

## 2022-12-05 RX ADMIN — FAMOTIDINE 20 MILLIGRAM(S): 10 INJECTION INTRAVENOUS at 09:49

## 2022-12-05 RX ADMIN — SODIUM CHLORIDE 2000 MILLILITER(S): 9 INJECTION, SOLUTION INTRAVENOUS at 06:15

## 2022-12-05 RX ADMIN — Medication 1000 MILLIUNIT(S)/MIN: at 17:54

## 2022-12-05 RX ADMIN — Medication 1000 MILLIUNIT(S)/MIN: at 14:39

## 2022-12-05 RX ADMIN — Medication 200 MILLIGRAM(S): at 17:53

## 2022-12-05 RX ADMIN — Medication 30 MILLIGRAM(S): at 22:24

## 2022-12-05 NOTE — OB RN DELIVERY SUMMARY - NSSELHIDDEN_OBGYN_ALL_OB_FT
[NS_[email or web address]_ALL_OB_FT:[id number]],[NS_DeliveryAssist1_OBGYN_ALL_OB_FT:[id number]],[NS_DeliveryRN_OBGYN_ALL_OB_FT:[id number]],[NS_DeliveryAssist2_OBGYN_ALL_OB_FT:[id number]]

## 2022-12-05 NOTE — OB RN DELIVERY SUMMARY - NS_SEPSISRSKCALC_OBGYN_ALL_OB_FT
EOS calculated successfully. EOS Risk Factor: 0.5/1000 live births ([facility] national incidence); GA=37w5d; Temp=98.24; ROM=0.017; GBS='Unknown'; Antibiotics='No antibiotics or any antibiotics < 2 hrs prior to birth'

## 2022-12-05 NOTE — OB RN INTRAOPERATIVE NOTE - NSSELHIDDEN_OBGYN_ALL_OB_FT
[NS_DeliveryAttending1_OBGYN_ALL_OB_FT:[id number]],[NS_DeliveryAssist1_OBGYN_ALL_OB_[id number]],[NS_DeliveryRN_OBGYN_ALL_OB_FT:[id number]],[NS_DeliveryAssist2_OBGYN_ALL_OB_FT:[id number]]

## 2022-12-05 NOTE — OB PROVIDER H&P - HISTORY OF PRESENT ILLNESS
32 year old  @ 37.5 weeks, FAISAL 22 dated by LMP (3/17/22) consistent with 1st Trimester US, presents to L&D for scheduled repeat  secondary to history of prior  with history of chronic hypertension. Patient states she has normal blood pressures at home 110-130/60-70s. Patient states she is on her Labetalol 200 mg BID, last taken at 4 AM today. NPO TIME 7 pm last night. Patient admits to normal fetal movement. Denies vaginal bleeding, leakage of fluid, painful contractions/lower abdominal cramping, fever/chills, shortness of breath,  chest pain, increased swelling, difficulty ambulating, loss of taste/smell, nausea/vomiting/diarrhea, rash, weakness, paresthesia, change in appetite, dizziness, lightheadedness, cough, nasal congestion, runny nose, headache, vision changes, right upper quadrant pain, epigastric pain, severe abdominal pain, increased swelling    OB History: : 10/25/2015, primary  for failed IOL, was IOL for oligohydramnios, FT, Female, labor complicated by chorioamnionitis, 6 lb 10 oz, delivered at [facility]   G2: Current pregnancy, complicated by chronic HTN on Labetalol 200 mg BID regimen, also with multiple fibroids some degenerating s/p indocin     GYN Hx: History of fibroids, "small" were not an issue in previous pregnancy as per patient, states they got much larger in this pregnancy, Denies ovarian cysts, HSV/ STDs, abnormal pap smears     Med Hx: Chronic Hypertension - on Labetalol 200 mg BID, last taken at 4 AM today, prior to pregnancy was taking Propranolol and HCTZ, diagnosed                Hypothyroidism - Synthroid 50 mcg daily, diagnosed               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, s/p 3 iron transfusions last on     Denies asthma, DM, CAD, or other medical issues    Meds: PNV, Synthroid 50 mg daily, Labetalol 200 mg Twice a day, Iron PO Daily, denies other medications including prescribed/OTC     Allergies: NKDA, [employer], NKFA    Surgical Hx:      Social: Denies cigarette/tobacco/alcohol/illicit drug use    Psych: Denies anxiety/depression/other mental health disease    Physical Exam:  Vitals: ICU Vital Signs Last 24 Hrs  T(C): --  T(F): --  HR: 74 (05 Dec 2022 06:24) (74 - 74)  BP: 117/74 (05 Dec 2022 06:24) (117/74 - 117/74)  BP(mean): --  ABP: --  ABP(mean): --  RR: --  SpO2: --      Gen: NAD, A+O x 3, resting comfortably  Resp: CTAB  Cardio: RRR  Abd: Gravid, soft, non-distended, non-tender to superficial and deep palpation in all 4 quadrants, no rebound/guarding  Ext: Warm, well perfused, 1+ nonpitting edema bilaterally    EFM: NST to be completed  [place]:  NST to be completed

## 2022-12-05 NOTE — OB RN PATIENT PROFILE - PRO HIV INFANT
Verbal shift change report given to JOSUE Orourke RN (oncoming nurse) by Kel Lane RN (offgoing nurse). Report included the following information SBAR and ED Summary. unknown

## 2022-12-05 NOTE — OB PROVIDER DELIVERY SUMMARY - NSPROVIDERDELIVERYNOTE_OBGYN_ALL_OB_FT
viable female infant, OA presentation, weight 2470g, APGARS 8/8  multiple fibroids noted on uterus, including one on lower uterine segment, approx 4cm in size, inhibiting lower transverse incision. Another subserosal fibriod, approx 5cm, noted ~2cm cranial to hysterotomy. ~3.5cm pedunculated fibroid on L. anterior wall. Small fibroids palpated on posterior uterine wall. Grossly normal tubes and ovaries.   hysterotomy closed in 2 layers with vicryl suture. Area on L. aspect of hysterotomy required oversewing to achieve hemostasis. Oversewn with vicryl, caprosyn, and chromic.   fibrillar x3, surgicell powder x2, Vistaseal, and Nunit used for hemostatic agents   2g TXA administered intra-op     1074/3L/375

## 2022-12-05 NOTE — OB PROVIDER H&P - ASSESSMENT
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 Cross   - Plan to move to OR on time schedule  - Educated and discussed with patient the assessment and plan, pt verbalized understanding and agreement with assessment and plan, all questions answered  - Discussed with Dr. Cross

## 2022-12-05 NOTE — OB RN DELIVERY SUMMARY - NS_CORDVESSELS_OBGYN_ALL_OB
Sunitha Jacques   4/26/2017 3:45 PM   Anticoagulation Therapy Visit    Description:  72 year old female   Provider:  [facility]   Department:  [facility]           INR as of 4/26/2017     Today's INR 5.7!      Anticoagulation Summary as of 4/26/2017     INR goal 2.0-3.0   Today's INR 5.7!   Full instructions 4/26: Hold; 4/27: Hold; Otherwise 2.5 mg on Tue, Fri; 5 mg all other days   Next INR check 4/28/2017    Indications   Atrial fibrillation with rapid ventricular response (H) [I48.91]  Cerebrovascular accident (H) [I63.9]  Long-term (current) use of anticoagulants [Z79.01] [Z79.01]         Your next Anticoagulation Clinic appointment(s)     Apr 28, 2017  2:45 PM CDT   Anticoagulation Visit with [facility]   [facility]    [address] [id number] [phone]              Contact Numbers     Please call [phone] to cancel and/or reschedule your appointment.  Please call [phone] with any problems or questions regarding your therapy          April 2017 Details    Sun Mon Tue Wed Thu Fri Sat           1                 2               3               4               5               6               7               8                 9               10               11               12               13               14               15                 16               17               18               19               20               21               22                 23               24               25               26      Hold   See details      27      Hold         28            29 30                      Date Details   04/26 This INR check       Date of next INR:  4/28/2017         How to take your warfarin dose     To take:  2.5 mg Take 1 of the 2.5 mg tablets.    Hold Do not take your warfarin dose. See the Details table to the right for additional instructions.                
3

## 2022-12-05 NOTE — OB PROVIDER DELIVERY SUMMARY - NSSELHIDDEN_OBGYN_ALL_OB_FT
[NS_DeliveryAttending1_OBGYN_ALL_OB_FT:[id number]],[NS_DeliveryAssist1_OBGYN_ALL_OB_FT:[id number]],[NS_DeliveryRN_OBGYN_ALL_OB_FT:[id number]],[NS_DeliveryAssist2_OBGYN_ALL_OB_FT:[id number]]

## 2022-12-05 NOTE — OB RN PATIENT PROFILE - NSICDXPASTMEDICALHX_GEN_ALL_CORE_FT
PAST MEDICAL HISTORY:  Anemia     Beta thalassemia trait     Fibroids     Hypertension     Hypothyroid     Pregnancy

## 2022-12-05 NOTE — OB PROVIDER H&P - NSHPLABSRESULTS_GEN_ALL_CORE
Prenatal Labs Reviewed:  T&S: B+ with Anti-Leb Antibodies   Rubella: not found in chart  Hep B: not found in chart  HIV: not found in chart  RPR: Neg  GCT: not found in chart  G/C: not found in chart  GBS: not found in chart    Ultrasounds:   12/2/22: Cephalic, posterior placenta, BPP 8/8, EFW 2137 g (19%ile), UA dopplers [facility] recommended delivery between  37 - 39 weeks   9/6/22: Cephalic, posterior placenta,  g (26%ile)     Myomas:    Site                 L(cm)     W(cm)     D(cm)     Location   Anterior             4         2.6       3.3       Subserosal   Anterior             6.4       3         6.4       Subserosal   Anterior             8.3       5.8       7.2       Subserosal   Anterior VAMSI         3.7       3.9       3.7       Submucosal  ----------------------------------------------------------------------   Blood Flow              RI       PI       Comments                                               Degenerating                                             Degenerating

## 2022-12-06 LAB
ALBUMIN SERPL ELPH-MCNC: 3.1 G/DL — LOW (ref 3.3–5)
ALP SERPL-CCNC: 59 U/L — SIGNIFICANT CHANGE UP (ref 40–120)
ALT FLD-CCNC: 12 U/L — SIGNIFICANT CHANGE UP (ref 4–33)
ANION GAP SERPL CALC-SCNC: 18 MMOL/L — HIGH (ref 7–14)
APTT BLD: 26 SEC — LOW (ref 27–36.3)
APTT BLD: 26.8 SEC — LOW (ref 27–36.3)
AST SERPL-CCNC: 38 U/L — HIGH (ref 4–32)
BASOPHILS # BLD AUTO: 0.03 K/UL — SIGNIFICANT CHANGE UP (ref 0–0.2)
BASOPHILS # BLD AUTO: 0.03 K/UL — SIGNIFICANT CHANGE UP (ref 0–0.2)
BASOPHILS NFR BLD AUTO: 0.2 % — SIGNIFICANT CHANGE UP (ref 0–2)
BASOPHILS NFR BLD AUTO: 0.3 % — SIGNIFICANT CHANGE UP (ref 0–2)
BILIRUB SERPL-MCNC: 2 MG/DL — HIGH (ref 0.2–1.2)
BUN SERPL-MCNC: 7 MG/DL — SIGNIFICANT CHANGE UP (ref 7–23)
CALCIUM SERPL-MCNC: 8.9 MG/DL — SIGNIFICANT CHANGE UP (ref 8.4–10.5)
CHLORIDE SERPL-SCNC: 98 MMOL/L — SIGNIFICANT CHANGE UP (ref 98–107)
CO2 SERPL-SCNC: 17 MMOL/L — LOW (ref 22–31)
CREAT SERPL-MCNC: 0.63 MG/DL — SIGNIFICANT CHANGE UP (ref 0.5–1.3)
EGFR: 119 ML/MIN/1.73M2 — SIGNIFICANT CHANGE UP
EOSINOPHIL # BLD AUTO: 0.02 K/UL — SIGNIFICANT CHANGE UP (ref 0–0.5)
EOSINOPHIL # BLD AUTO: 0.04 K/UL — SIGNIFICANT CHANGE UP (ref 0–0.5)
EOSINOPHIL NFR BLD AUTO: 0.2 % — SIGNIFICANT CHANGE UP (ref 0–6)
EOSINOPHIL NFR BLD AUTO: 0.3 % — SIGNIFICANT CHANGE UP (ref 0–6)
FIBRINOGEN PPP-MCNC: 520 MG/DL — HIGH (ref 200–465)
GLUCOSE SERPL-MCNC: 88 MG/DL — SIGNIFICANT CHANGE UP (ref 70–99)
HAPTOGLOB SERPL-MCNC: 49 MG/DL — SIGNIFICANT CHANGE UP (ref 34–200)
HCT VFR BLD CALC: 20.2 % — CRITICAL LOW (ref 34.5–45)
HCT VFR BLD CALC: 21.7 % — LOW (ref 34.5–45)
HCT VFR BLD CALC: 26 % — LOW (ref 34.5–45)
HGB BLD-MCNC: 6.5 G/DL — CRITICAL LOW (ref 11.5–15.5)
HGB BLD-MCNC: 7 G/DL — CRITICAL LOW (ref 11.5–15.5)
HGB BLD-MCNC: 7.9 G/DL — LOW (ref 11.5–15.5)
IANC: 12.17 K/UL — HIGH (ref 1.8–7.4)
IANC: 9.6 K/UL — HIGH (ref 1.8–7.4)
IMM GRANULOCYTES NFR BLD AUTO: 0.6 % — SIGNIFICANT CHANGE UP (ref 0–0.9)
IMM GRANULOCYTES NFR BLD AUTO: 0.7 % — SIGNIFICANT CHANGE UP (ref 0–0.9)
INR BLD: 1.03 RATIO — SIGNIFICANT CHANGE UP (ref 0.88–1.16)
INR BLD: 1.12 RATIO — SIGNIFICANT CHANGE UP (ref 0.88–1.16)
LACTATE SERPL-SCNC: 1.1 MMOL/L — SIGNIFICANT CHANGE UP (ref 0.5–2)
LACTATE SERPL-SCNC: 6 MMOL/L — CRITICAL HIGH (ref 0.5–2)
LDH SERPL L TO P-CCNC: 394 U/L — HIGH (ref 135–225)
LYMPHOCYTES # BLD AUTO: 1.17 K/UL — SIGNIFICANT CHANGE UP (ref 1–3.3)
LYMPHOCYTES # BLD AUTO: 1.29 K/UL — SIGNIFICANT CHANGE UP (ref 1–3.3)
LYMPHOCYTES # BLD AUTO: 10.7 % — LOW (ref 13–44)
LYMPHOCYTES # BLD AUTO: 9 % — LOW (ref 13–44)
MCHC RBC-ENTMCNC: 20.9 PG — LOW (ref 27–34)
MCHC RBC-ENTMCNC: 21.1 PG — LOW (ref 27–34)
MCHC RBC-ENTMCNC: 21.2 PG — LOW (ref 27–34)
MCHC RBC-ENTMCNC: 30.4 GM/DL — LOW (ref 32–36)
MCHC RBC-ENTMCNC: 32.2 GM/DL — SIGNIFICANT CHANGE UP (ref 32–36)
MCHC RBC-ENTMCNC: 32.3 GM/DL — SIGNIFICANT CHANGE UP (ref 32–36)
MCV RBC AUTO: 65.6 FL — LOW (ref 80–100)
MCV RBC AUTO: 65.8 FL — LOW (ref 80–100)
MCV RBC AUTO: 68.8 FL — LOW (ref 80–100)
MONOCYTES # BLD AUTO: 0.07 K/UL — SIGNIFICANT CHANGE UP (ref 0–0.9)
MONOCYTES # BLD AUTO: 0.76 K/UL — SIGNIFICANT CHANGE UP (ref 0–0.9)
MONOCYTES NFR BLD AUTO: 0.6 % — LOW (ref 2–14)
MONOCYTES NFR BLD AUTO: 5.3 % — SIGNIFICANT CHANGE UP (ref 2–14)
NEUTROPHILS # BLD AUTO: 12.17 K/UL — HIGH (ref 1.8–7.4)
NEUTROPHILS # BLD AUTO: 9.6 K/UL — HIGH (ref 1.8–7.4)
NEUTROPHILS NFR BLD AUTO: 84.6 % — HIGH (ref 43–77)
NEUTROPHILS NFR BLD AUTO: 87.5 % — HIGH (ref 43–77)
NRBC # BLD: 0 /100 WBCS — SIGNIFICANT CHANGE UP (ref 0–0)
NRBC # FLD: 0 K/UL — SIGNIFICANT CHANGE UP (ref 0–0)
NRBC # FLD: 0.02 K/UL — HIGH (ref 0–0)
NRBC # FLD: 0.02 K/UL — HIGH (ref 0–0)
PLATELET # BLD AUTO: 263 K/UL — SIGNIFICANT CHANGE UP (ref 150–400)
PLATELET # BLD AUTO: 266 K/UL — SIGNIFICANT CHANGE UP (ref 150–400)
PLATELET # BLD AUTO: 304 K/UL — SIGNIFICANT CHANGE UP (ref 150–400)
POTASSIUM SERPL-MCNC: 4.9 MMOL/L — SIGNIFICANT CHANGE UP (ref 3.5–5.3)
POTASSIUM SERPL-SCNC: 4.9 MMOL/L — SIGNIFICANT CHANGE UP (ref 3.5–5.3)
PROT SERPL-MCNC: 5.9 G/DL — LOW (ref 6–8.3)
PROTHROM AB SERPL-ACNC: 11.9 SEC — SIGNIFICANT CHANGE UP (ref 10.5–13.4)
PROTHROM AB SERPL-ACNC: 13 SEC — SIGNIFICANT CHANGE UP (ref 10.5–13.4)
RBC # BLD: 3.07 M/UL — LOW (ref 3.8–5.2)
RBC # BLD: 3.31 M/UL — LOW (ref 3.8–5.2)
RBC # BLD: 3.78 M/UL — LOW (ref 3.8–5.2)
RBC # FLD: 15.7 % — HIGH (ref 10.3–14.5)
RBC # FLD: 15.9 % — HIGH (ref 10.3–14.5)
RBC # FLD: 16.2 % — HIGH (ref 10.3–14.5)
RUBV IGG SER-ACNC: 1.5 INDEX — SIGNIFICANT CHANGE UP
RUBV IGG SER-IMP: POSITIVE — SIGNIFICANT CHANGE UP
SODIUM SERPL-SCNC: 133 MMOL/L — LOW (ref 135–145)
T PALLIDUM AB TITR SER: NEGATIVE — SIGNIFICANT CHANGE UP
TRANSFUSION REACTION INTERP 1: SIGNIFICANT CHANGE UP
TRANSFUSION REACTION INTERP 2: SIGNIFICANT CHANGE UP
URATE SERPL-MCNC: 4.5 MG/DL — SIGNIFICANT CHANGE UP (ref 2.5–7)
WBC # BLD: 10.97 K/UL — HIGH (ref 3.8–10.5)
WBC # BLD: 14.38 K/UL — HIGH (ref 3.8–10.5)
WBC # BLD: 15.88 K/UL — HIGH (ref 3.8–10.5)
WBC # FLD AUTO: 10.97 K/UL — HIGH (ref 3.8–10.5)
WBC # FLD AUTO: 14.38 K/UL — HIGH (ref 3.8–10.5)
WBC # FLD AUTO: 15.88 K/UL — HIGH (ref 3.8–10.5)

## 2022-12-06 PROCEDURE — 74174 CTA ABD&PLVS W/CONTRAST: CPT | Mod: 26

## 2022-12-06 PROCEDURE — 86078 PHYS BLOOD BANK SERV REACTJ: CPT

## 2022-12-06 PROCEDURE — 99232 SBSQ HOSP IP/OBS MODERATE 35: CPT

## 2022-12-06 PROCEDURE — 93010 ELECTROCARDIOGRAM REPORT: CPT

## 2022-12-06 RX ORDER — ACETAMINOPHEN 500 MG
1000 TABLET ORAL ONCE
Refills: 0 | Status: COMPLETED | OUTPATIENT
Start: 2022-12-06 | End: 2022-12-06

## 2022-12-06 RX ORDER — ACETAMINOPHEN 500 MG
975 TABLET ORAL ONCE
Refills: 0 | Status: COMPLETED | OUTPATIENT
Start: 2022-12-06 | End: 2022-12-06

## 2022-12-06 RX ORDER — SODIUM CHLORIDE 9 MG/ML
1000 INJECTION, SOLUTION INTRAVENOUS ONCE
Refills: 0 | Status: COMPLETED | OUTPATIENT
Start: 2022-12-06 | End: 2022-12-06

## 2022-12-06 RX ORDER — IBUPROFEN 200 MG
600 TABLET ORAL EVERY 6 HOURS
Refills: 0 | Status: DISCONTINUED | OUTPATIENT
Start: 2022-12-06 | End: 2022-12-08

## 2022-12-06 RX ORDER — FERROUS SULFATE 325(65) MG
325 TABLET ORAL DAILY
Refills: 0 | Status: DISCONTINUED | OUTPATIENT
Start: 2022-12-06 | End: 2022-12-06

## 2022-12-06 RX ORDER — DIPHENHYDRAMINE HCL 50 MG
25 CAPSULE ORAL ONCE
Refills: 0 | Status: COMPLETED | OUTPATIENT
Start: 2022-12-06 | End: 2022-12-06

## 2022-12-06 RX ORDER — ASCORBIC ACID 60 MG
500 TABLET,CHEWABLE ORAL DAILY
Refills: 0 | Status: DISCONTINUED | OUTPATIENT
Start: 2022-12-06 | End: 2022-12-08

## 2022-12-06 RX ORDER — FERROUS SULFATE 325(65) MG
325 TABLET ORAL THREE TIMES A DAY
Refills: 0 | Status: DISCONTINUED | OUTPATIENT
Start: 2022-12-06 | End: 2022-12-08

## 2022-12-06 RX ADMIN — Medication 25 MILLIGRAM(S): at 09:09

## 2022-12-06 RX ADMIN — MAGNESIUM HYDROXIDE 30 MILLILITER(S): 400 TABLET, CHEWABLE ORAL at 03:58

## 2022-12-06 RX ADMIN — Medication 325 MILLIGRAM(S): at 21:30

## 2022-12-06 RX ADMIN — Medication 975 MILLIGRAM(S): at 03:43

## 2022-12-06 RX ADMIN — Medication 400 MILLIGRAM(S): at 14:32

## 2022-12-06 RX ADMIN — Medication 50 MICROGRAM(S): at 06:07

## 2022-12-06 RX ADMIN — SODIUM CHLORIDE 1000 MILLILITER(S): 9 INJECTION, SOLUTION INTRAVENOUS at 13:00

## 2022-12-06 RX ADMIN — Medication 975 MILLIGRAM(S): at 09:09

## 2022-12-06 RX ADMIN — Medication 30 MILLIGRAM(S): at 06:37

## 2022-12-06 RX ADMIN — Medication 325 MILLIGRAM(S): at 17:17

## 2022-12-06 RX ADMIN — Medication 600 MILLIGRAM(S): at 20:17

## 2022-12-06 RX ADMIN — SODIUM CHLORIDE 1000 MILLILITER(S): 9 INJECTION, SOLUTION INTRAVENOUS at 10:20

## 2022-12-06 RX ADMIN — Medication 975 MILLIGRAM(S): at 09:45

## 2022-12-06 RX ADMIN — Medication 600 MILLIGRAM(S): at 21:23

## 2022-12-06 RX ADMIN — SENNA PLUS 2 TABLET(S): 8.6 TABLET ORAL at 21:30

## 2022-12-06 RX ADMIN — Medication 975 MILLIGRAM(S): at 22:40

## 2022-12-06 RX ADMIN — Medication 975 MILLIGRAM(S): at 21:30

## 2022-12-06 RX ADMIN — Medication 1000 MILLIGRAM(S): at 14:45

## 2022-12-06 RX ADMIN — SIMETHICONE 80 MILLIGRAM(S): 80 TABLET, CHEWABLE ORAL at 20:18

## 2022-12-06 RX ADMIN — Medication 30 MILLIGRAM(S): at 06:07

## 2022-12-06 RX ADMIN — SIMETHICONE 80 MILLIGRAM(S): 80 TABLET, CHEWABLE ORAL at 03:58

## 2022-12-06 RX ADMIN — SIMETHICONE 80 MILLIGRAM(S): 80 TABLET, CHEWABLE ORAL at 14:55

## 2022-12-06 RX ADMIN — SIMETHICONE 80 MILLIGRAM(S): 80 TABLET, CHEWABLE ORAL at 09:43

## 2022-12-06 RX ADMIN — MAGNESIUM HYDROXIDE 30 MILLILITER(S): 400 TABLET, CHEWABLE ORAL at 20:17

## 2022-12-06 RX ADMIN — HEPARIN SODIUM 5000 UNIT(S): 5000 INJECTION INTRAVENOUS; SUBCUTANEOUS at 20:17

## 2022-12-06 RX ADMIN — Medication 500 MILLIGRAM(S): at 17:17

## 2022-12-06 RX ADMIN — Medication 975 MILLIGRAM(S): at 04:43

## 2022-12-06 NOTE — CHART NOTE - NSCHARTNOTEFT_GEN_A_CORE
R1 CHART NOTE    S: Pt reassessed for tachycardia to 130s. Chills have resolved and lip tingling is improving. She has moderate lower abdominal pain. She denies HA, dizziness, chest pain, palpitations, SOB, leg swelling.     Vital Signs Last 24 Hrs  T(C): 36.7 (06 Dec 2022 14:33), Max: 37.8 (06 Dec 2022 10:35)  T(F): 98.1 (06 Dec 2022 14:33), Max: 100.1 (06 Dec 2022 10:35)  HR: 121 (06 Dec 2022 14:33) (62 - 137)  BP: 93/56 (06 Dec 2022 14:33) (90/54 - 135/106)  BP(mean): 75 (05 Dec 2022 21:30) (75 - 101)  RR: 19 (06 Dec 2022 14:33) (14 - 40)  SpO2: 99% (06 Dec 2022 14:33) (97% - 100%)    Parameters below as of 06 Dec 2022 14:33  Patient On (Oxygen Delivery Method): room air    PE:  Gen: well-appearing  CV: tachycardic  Pulm: CTAB      EKG  CT Angiogram A/P (12/6): Wet read by Jorge Gna MD - rectus hematoma but no active bleed        A/P:     Nicolle Tee PGY1  Pt seen with Janeen Mo PGY1 R1 CHART NOTE    S: Pt reassessed for tachycardia to 130s. Chills have resolved and lip tingling is improving. She has moderate lower abdominal pain. She denies HA, dizziness, chest pain, palpitations, SOB, leg swelling.     Vital Signs Last 24 Hrs  T(C): 36.7 (06 Dec 2022 14:33), Max: 37.8 (06 Dec 2022 10:35)  T(F): 98.1 (06 Dec 2022 14:33), Max: 100.1 (06 Dec 2022 10:35)  HR: 121 (06 Dec 2022 14:33) (62 - 137)  BP: 93/56 (06 Dec 2022 14:33) (90/54 - 135/106)  BP(mean): 75 (05 Dec 2022 21:30) (75 - 101)  RR: 19 (06 Dec 2022 14:33) (14 - 40)  SpO2: 99% (06 Dec 2022 14:33) (97% - 100%)    Parameters below as of 06 Dec 2022 14:33  Patient On (Oxygen Delivery Method): room air    PE:  Gen: well-appearing  CV: tachycardic  Pulm: CTAB  Abd: soft, mildly distended, appropriate tenderness to palpation, tympanic to percussion               7.9    10.97 )-----------( 304      ( 12-06 @ 10:50 )             26.0     Lactate 12/6: 6    PT/INR - ( 06 Dec 2022 10:50 )   PT: 11.9 sec;   INR: 1.03 ratio         PTT - ( 06 Dec 2022 10:50 )  PTT:26.8 sec    12-06 @ 10:50    133  |  98  |  7   ----------------------------<  88  4.9   |  17  |  0.63        EKG 12/6 11:05:06: atrial flutter with 2:1 AV conduction, abnormal ECG  EKG 12/6 11:05:50: sinus tachycardia, otherwise normal ECG  CT Angiogram A/P (12/6): Wet read by Jorge Gan MD - rectus hematoma but no active bleed        A/P: A/P: 32y POD#1 s/p rMTCS c/b elevated QBL (1074cc) in the setting of chronic anemia as well as cHTN. Pt presenting with tachycardia to 130s. Differential includes transfusion reaction, continued blood loss, and infection  - s/p 2L LR bolus  - CTAP negative for active bleed  - Repeat CBC/coags/lactate at 3p  - f/u UCx/BCx to determine if infection is present  - Cardiology consult for tachycardia  - f/u transfusion reaction labs    Nicolle Tee, PGY1  Pt seen with Janeen Mo, PIOTRY1, d/w Dr. Cross

## 2022-12-06 NOTE — CHART NOTE - NSCHARTNOTEFT_GEN_A_CORE
*Charting delayed 2/2 clinical duties*    S: RRT called at approx 10:30am for tachycardia to 130s. Patient immediately seen at bedside by OB team including Dr Sun,  Dr Osorio and PGY 1s Dr Martinez and Dr Tee. Patient diaphoretic with tachycardia *Charting delayed 2/2 clinical duties*    S: RRT called at approx 10:30am for tachycardia to 130s. Patient immediately seen at bedside by OB team including Dr Sun,  Dr Osorio and PGY 1s Dr Martinez and Dr Tee. Patient currently receiving 1U pRBC, started 1 hour prior to RRT, for symptomatic anemia with drop in H/H appropriate for EBL. Labetalol for cHTN held in AM per hold parameters.      Patient diaphoretic with tachycardia to 137 with /86. Notes feeling tingling throughout her body with moderate abdominal pain. She denies palpitations, difficulty breathing or chest pain. Lochia wnl. No pain / swelling in LE.     PE:  Vital Signs Last 24 Hrs  T(C): 36.7 (06 Dec 2022 15:56), Max: 37.8 (06 Dec 2022 10:35)  T(F): 98 (06 Dec 2022 15:56), Max: 100.1 (06 Dec 2022 10:35)  HR: 102 (06 Dec 2022 15:56) (66 - 137)  BP: 102/61 (06 Dec 2022 15:56) (90/54 - 135/106)  BP(mean): 75 (05 Dec 2022 21:30) (75 - 92)  RR: 18 (06 Dec 2022 15:56) (16 - 40)  SpO2: 98% (06 Dec 2022 15:56) (97% - 100%)    Parameters below as of 06 Dec 2022 14:33  Patient On (Oxygen Delivery Method): room air    I&O's Detail    05 Dec 2022 07:01  -  06 Dec 2022 07:00  --------------------------------------------------------  IN:    IV PiggyBack: 100 mL    Lactated Ringers: 375 mL    Lactated Ringers: 375 mL    Other (mL): 3000 mL    Oxytocin: 625 mL  Total IN: 4475 mL    OUT:    Blood Loss (mL): 1074 mL    Indwelling Catheter - Urethral (mL): 960 mL    Voided (mL): 600 mL  Total OUT: 2634 mL    Total NET: 1841 mL      06 Dec 2022 07:01  -  06 Dec 2022 16:26  --------------------------------------------------------  IN:    Lactated Ringers Bolus: 2000 mL    PRBCs (Packed Red Blood Cells): 100 mL  Total IN: 2100 mL    OUT:    Voided (mL): 1100 mL  Total OUT: 1100 mL    Total NET: 1000 mL      General: Diaphoretic, periorbital edema noted, A+Ox3  CV: S1S2 Clear. RRR.  Pulm: CTA b/l   Abd: +BS. Soft, nondistended. Moderately tender. Fundus firm. No rebound, no guarding  : scant VB  Extremities: Non-tender b/l, no edema.                          7.9    10.97 )-----------( 304      ( 06 Dec 2022 10:50 )             26.0     7.9  6.5  8.1      A/P: A/P: 32y POD#1 s/p rMTCS c/b PPH ([id number]), acute on chronic anemia receiving 1U pRBC and cHTN. Patient with new onset tachycardia and diaphoresis c/f allergic transfusion reaction vs hemolytic transfusion reaction v hypovolemia 2/2 acute blood loss anemia vs infectious etiology.   - Transfusion stopped  - 1L LR bolus started   - Benadryl 25mg IV given   - T&S active  - Hemolysis labs sent  - BCx, UCx, lactate, CBC, CMP, Coags sent   - STAT EKG   - CT AP to look for evidence of bleeding   - Pt stable. Will monitor closely.    Xiao Lay, PGY-3  seen with Dr Sun, Dr Osorio, RRT team and SICU team who ran RRT

## 2022-12-06 NOTE — PROVIDER CONTACT NOTE (OTHER) - RECOMMENDATIONS
OBteam and surgical rapid response team @ bedside. evaluated pt. OBteam and surgical rapid response team @ bedside. evaluated pt. pt c/o nausea ,tingling on lips.benadryl  25 mg IVP given.

## 2022-12-06 NOTE — CONSULT NOTE ADULT - SUBJECTIVE AND OBJECTIVE BOX
31 yo F admitted yesterday for Csection, PMH of HTN, Hypothyroidism and Beta-thalassemia (Iron Infusions). Patient underwent Csection yesterday, has baseline Hgb of ~ 11 per her report prior to pregnancy, ~ 9 on admission with decrease to 6.5 post Csection. During transfusion of PRBCs, patient noted to have tachycardia to 130s with chills, numbness and tingling. Transfusion was not completed. Lactate ~ 6. She denies chest pain, shortness of breath, palpitations. HRs have decreased to low 100s.          O:  T(C): 37.1 (12-06-22 @ 17:33), Max: 37.8 (12-06-22 @ 10:30)  HR: 99 (12-06-22 @ 17:33) (72 - 139)  BP: 98/57 (12-06-22 @ 17:33) (90/54 - 145/97)  RR: 18 (12-06-22 @ 17:33) (16 - 40)  SpO2: 100% (12-06-22 @ 17:33) (97% - 100%)     EKG x 2 Sinus tachycardia    O/E:  Gen: NAD  HEENT: EOMI  CV: RRR, normal S1 + S2, no m/r/g  Lungs: CTAB  Abd: distended  Ext: No edema    Labs:                        7.0    15.88 )-----------( 263      ( 06 Dec 2022 16:00 )             21.7     12-06    133<L>  |  98  |  7   ----------------------------<  88  4.9   |  17<L>  |  0.63    Ca    8.9      06 Dec 2022 10:50    TPro  5.9<L>  /  Alb  3.1<L>  /  TBili  2.0<H>  /  DBili  x   /  AST  38<H>  /  ALT  12  /  AlkPhos  59  12-06    PT/INR - ( 06 Dec 2022 16:00 )   PT: 13.0 sec;   INR: 1.12 ratio         PTT - ( 06 Dec 2022 16:00 )  PTT:26.0 sec          Meds:  MEDICATIONS  (STANDING):  acetaminophen     Tablet .. 975 milliGRAM(s) Oral <User Schedule>  ascorbic acid 500 milliGRAM(s) Oral daily  diphtheria/tetanus/pertussis (acellular) Vaccine (Adacel) 0.5 milliLiter(s) IntraMuscular once  ferrous    sulfate 325 milliGRAM(s) Oral three times a day  heparin   Injectable 5000 Unit(s) SubCutaneous every 12 hours  ibuprofen  Tablet. 600 milliGRAM(s) Oral every 6 hours  influenza   Vaccine 0.5 milliLiter(s) IntraMuscular once  labetalol 200 milliGRAM(s) Oral every 12 hours  lactated ringers Bolus 500 milliLiter(s) IV Bolus once  lactated ringers Bolus 500 milliLiter(s) IV Bolus once  levothyroxine 50 MICROGram(s) Oral daily  senna 2 Tablet(s) Oral at bedtime

## 2022-12-06 NOTE — CHART NOTE - NSCHARTNOTEFT_GEN_A_CORE
R1 EVENT NOTE    S: Pt evaluated for chills and tachycardia to the 130s 1 hour after initiation of transfusion. She had tingling and numbness around her lips, nausea, and moderate abdominal pain. Denies SOB, chest pain, palpitations, leg pain    Vital Signs Last 24 Hrs  T(C): 36.9 (06 Dec 2022 05:28), Max: 36.9 (06 Dec 2022 05:28)  T(F): 98.5 (06 Dec 2022 05:28), Max: 98.5 (06 Dec 2022 05:28)  HR: 99 (06 Dec 2022 05:28) (62 - 99)  BP: 95/54 (06 Dec 2022 05:28) (90/54 - 133/91)  BP(mean): 75 (05 Dec 2022 21:30) (75 - 101)  RR: 18 (06 Dec 2022 05:28) (13 - 24)  SpO2: 100% (06 Dec 2022 05:28) (99% - 100%)    Parameters below as of 06 Dec 2022 05:28  Patient On (Oxygen Delivery Method): room air    PE:  CV: tachycardic  Pulm: CTAB  Abd: mildly distended, tympanic to percussion, diffusely tender to palpation, no rebound/guarding  Ext: No edema/erythema               6.5    14.38 )-----------( 266      ( 12-06 @ 05:20 )             20.2     12-05 @ 06:10    133  |  103  |  6   ----------------------------<  81  4.2   |  20  |  0.45    PT/INR - ( 05 Dec 2022 06:10 )   PT: 11.2 sec;   INR: 0.97 ratio         PTT - ( 05 Dec 2022 06:10 )  PTT:25.8 sec    A/P: 32y POD#1 s/p rMTCS c/b elevated QBL (1074cc) in the setting of chronic anemia as well as cHTN. Pt presenting with chills, tachycardia, lip tingling, and temp of 100.1 one hour after transfusion. Differential includes transfusion reaction, continued blood loss, and infection  - Transfusion discontinued  - Benadryl IV  - STAT labs: transfusion reaction workup, CBC, coags, CMP, lactate  - Blood culture, Urine culture    Nicolle Tee, PGY1  Pt seen with Dr. Lay R1 EVENT NOTE    S: Pt evaluated for chills and tachycardia to the 130s 1 hour after initiation of transfusion. She had tingling and numbness around her lips, nausea, and moderate abdominal pain. Denies SOB, chest pain, palpitations, leg pain    Vital Signs Last 24 Hrs  T(C): 36.9 (06 Dec 2022 05:28), Max: 36.9 (06 Dec 2022 05:28)  T(F): 98.5 (06 Dec 2022 05:28), Max: 98.5 (06 Dec 2022 05:28)  HR: 99 (06 Dec 2022 05:28) (62 - 99)  BP: 95/54 (06 Dec 2022 05:28) (90/54 - 133/91)  BP(mean): 75 (05 Dec 2022 21:30) (75 - 101)  RR: 18 (06 Dec 2022 05:28) (13 - 24)  SpO2: 100% (06 Dec 2022 05:28) (99% - 100%)    Parameters below as of 06 Dec 2022 05:28  Patient On (Oxygen Delivery Method): room air    PE:  CV: tachycardic  Pulm: CTAB  Abd: mildly distended, tympanic to percussion, diffusely tender to palpation, no rebound/guarding  Ext: No edema/erythema               6.5    14.38 )-----------( 266      ( 12-06 @ 05:20 )             20.2     12-05 @ 06:10    133  |  103  |  6   ----------------------------<  81  4.2   |  20  |  0.45    PT/INR - ( 05 Dec 2022 06:10 )   PT: 11.2 sec;   INR: 0.97 ratio         PTT - ( 05 Dec 2022 06:10 )  PTT:25.8 sec    A/P: 32y POD#1 s/p rMTCS c/b elevated QBL (1074cc) in the setting of chronic anemia as well as cHTN. Pt presenting with chills, tachycardia, lip tingling, and temp of 100.1 one hour after transfusion. Differential includes transfusion reaction, continued blood loss, and infection  - Transfusion discontinued  - Benadryl IV  - STAT labs: transfusion reaction workup, CBC, coags, CMP, lactate  - Blood culture, Urine culture  - EKG    Nicolle Tee, PGY1  Pt seen with Dr. Lay R1 EVENT NOTE    S: Came to bedside for rapid response. Patient had chills and tachycardia to the 130s 1 hour after initiation of transfusion. She had tingling and numbness around her lips, nausea, and moderate abdominal pain. Denies SOB, chest pain, palpitations, leg pain    Vital Signs Last 24 Hrs  T(C): 36.9 (06 Dec 2022 05:28), Max: 36.9 (06 Dec 2022 05:28)  T(F): 98.5 (06 Dec 2022 05:28), Max: 98.5 (06 Dec 2022 05:28)  HR: 99 (06 Dec 2022 05:28) (62 - 99)  BP: 95/54 (06 Dec 2022 05:28) (90/54 - 133/91)  BP(mean): 75 (05 Dec 2022 21:30) (75 - 101)  RR: 18 (06 Dec 2022 05:28) (13 - 24)  SpO2: 100% (06 Dec 2022 05:28) (99% - 100%)    Parameters below as of 06 Dec 2022 05:28  Patient On (Oxygen Delivery Method): room air    PE:  CV: tachycardic  Pulm: CTAB  Abd: mildly distended, tympanic to percussion, diffusely tender to palpation, no rebound/guarding  Ext: No edema/erythema               6.5    14.38 )-----------( 266      ( 12-06 @ 05:20 )             20.2     12-05 @ 06:10    133  |  103  |  6   ----------------------------<  81  4.2   |  20  |  0.45    PT/INR - ( 05 Dec 2022 06:10 )   PT: 11.2 sec;   INR: 0.97 ratio         PTT - ( 05 Dec 2022 06:10 )  PTT:25.8 sec    A/P: 32y POD#1 s/p rMTCS c/b elevated QBL (1074cc) in the setting of chronic anemia as well as cHTN. Pt presenting with chills, tachycardia, lip tingling, and temp of 100.1 one hour after transfusion. Differential includes transfusion reaction, continued blood loss, and infection  - Transfusion discontinued  - Benadryl IV  - STAT labs: transfusion reaction workup, CBC, coags, CMP, lactate  - Blood culture, Urine culture  - EKG    Nicolle Tee, PGY1  Pt seen with Dr. Lay, Dr. Osorio, and Dr. Sun  d/w Dr. Cross

## 2022-12-06 NOTE — CHART NOTE - NSCHARTNOTEFT_GEN_A_CORE
Dr. Mo was notified of H&H 6.5/20.2, pt. currently asymptomatic. will be evaluated on [facility].  Signed out to TARA Wasserman.    Varsha Ernandez NP

## 2022-12-06 NOTE — PROVIDER CONTACT NOTE (OTHER) - SITUATION
s/p c/s on 12/5/22@ 11.07 am, with CHTN ,QBL 1074 ml, post OP  H& H 6.5/20.2. 1 st unit B.T on going.pt c/o chills s/p c/s on 12/5/22@ 11.07 am, with CHTN ,QBL 1074 ml, post OP  H& H 6.5/20.2. 1 st unit B.T on going.pt c/o chills,

## 2022-12-06 NOTE — CONSULT NOTE ADULT - ASSESSMENT
- Sinus tachycardia likely subsequent to fluid shifts, anemia, pain. Improving at this time. Would continue current dose of Labetalol, BP controlled, will assist in HR control as well.  -  Lactate, LDH, CBC monitoring per Primary team, noted ileus/hematoma with no active bleed on CT Angio Abdomen

## 2022-12-06 NOTE — PROVIDER CONTACT NOTE (CRITICAL VALUE NOTIFICATION) - ASSESSMENT
LAST BP 95/54 HR 99 asymptomatic, Labetalol hold per parameters
denies any SOB, Palpitation or lightheadedness .walks to bathroom. v/s in sunrise

## 2022-12-07 LAB
BASOPHILS # BLD AUTO: 0.04 K/UL — SIGNIFICANT CHANGE UP (ref 0–0.2)
BASOPHILS NFR BLD AUTO: 0.3 % — SIGNIFICANT CHANGE UP (ref 0–2)
CULTURE RESULTS: NO GROWTH — SIGNIFICANT CHANGE UP
EOSINOPHIL # BLD AUTO: 0.02 K/UL — SIGNIFICANT CHANGE UP (ref 0–0.5)
EOSINOPHIL NFR BLD AUTO: 0.1 % — SIGNIFICANT CHANGE UP (ref 0–6)
HCT VFR BLD CALC: 23.1 % — LOW (ref 34.5–45)
HGB BLD-MCNC: 7.3 G/DL — LOW (ref 11.5–15.5)
IANC: 14.11 K/UL — HIGH (ref 1.8–7.4)
IMM GRANULOCYTES NFR BLD AUTO: 0.8 % — SIGNIFICANT CHANGE UP (ref 0–0.9)
LYMPHOCYTES # BLD AUTO: 0.9 K/UL — LOW (ref 1–3.3)
LYMPHOCYTES # BLD AUTO: 5.7 % — LOW (ref 13–44)
MCHC RBC-ENTMCNC: 20.9 PG — LOW (ref 27–34)
MCHC RBC-ENTMCNC: 31.6 GM/DL — LOW (ref 32–36)
MCV RBC AUTO: 66 FL — LOW (ref 80–100)
MONOCYTES # BLD AUTO: 0.6 K/UL — SIGNIFICANT CHANGE UP (ref 0–0.9)
MONOCYTES NFR BLD AUTO: 3.8 % — SIGNIFICANT CHANGE UP (ref 2–14)
NEUTROPHILS # BLD AUTO: 14.11 K/UL — HIGH (ref 1.8–7.4)
NEUTROPHILS NFR BLD AUTO: 89.3 % — HIGH (ref 43–77)
NRBC # BLD: 0 /100 WBCS — SIGNIFICANT CHANGE UP (ref 0–0)
NRBC # FLD: 0 K/UL — SIGNIFICANT CHANGE UP (ref 0–0)
PLATELET # BLD AUTO: 318 K/UL — SIGNIFICANT CHANGE UP (ref 150–400)
RBC # BLD: 3.5 M/UL — LOW (ref 3.8–5.2)
RBC # FLD: 16.1 % — HIGH (ref 10.3–14.5)
SPECIMEN SOURCE: SIGNIFICANT CHANGE UP
WBC # BLD: 15.79 K/UL — HIGH (ref 3.8–10.5)
WBC # FLD AUTO: 15.79 K/UL — HIGH (ref 3.8–10.5)

## 2022-12-07 RX ORDER — FAMOTIDINE 10 MG/ML
20 INJECTION INTRAVENOUS ONCE
Refills: 0 | Status: COMPLETED | OUTPATIENT
Start: 2022-12-07 | End: 2022-12-07

## 2022-12-07 RX ADMIN — MAGNESIUM HYDROXIDE 30 MILLILITER(S): 400 TABLET, CHEWABLE ORAL at 10:48

## 2022-12-07 RX ADMIN — Medication 975 MILLIGRAM(S): at 15:44

## 2022-12-07 RX ADMIN — HEPARIN SODIUM 5000 UNIT(S): 5000 INJECTION INTRAVENOUS; SUBCUTANEOUS at 08:48

## 2022-12-07 RX ADMIN — Medication 600 MILLIGRAM(S): at 18:24

## 2022-12-07 RX ADMIN — Medication 600 MILLIGRAM(S): at 01:55

## 2022-12-07 RX ADMIN — FAMOTIDINE 20 MILLIGRAM(S): 10 INJECTION INTRAVENOUS at 10:48

## 2022-12-07 RX ADMIN — Medication 500 MILLIGRAM(S): at 15:03

## 2022-12-07 RX ADMIN — HEPARIN SODIUM 5000 UNIT(S): 5000 INJECTION INTRAVENOUS; SUBCUTANEOUS at 20:36

## 2022-12-07 RX ADMIN — Medication 50 MICROGRAM(S): at 05:22

## 2022-12-07 RX ADMIN — SIMETHICONE 80 MILLIGRAM(S): 80 TABLET, CHEWABLE ORAL at 05:22

## 2022-12-07 RX ADMIN — Medication 600 MILLIGRAM(S): at 02:50

## 2022-12-07 RX ADMIN — Medication 325 MILLIGRAM(S): at 15:03

## 2022-12-07 RX ADMIN — Medication 975 MILLIGRAM(S): at 15:04

## 2022-12-07 RX ADMIN — Medication 975 MILLIGRAM(S): at 20:37

## 2022-12-07 RX ADMIN — Medication 200 MILLIGRAM(S): at 05:21

## 2022-12-07 RX ADMIN — SENNA PLUS 2 TABLET(S): 8.6 TABLET ORAL at 20:37

## 2022-12-07 RX ADMIN — Medication 600 MILLIGRAM(S): at 17:41

## 2022-12-07 RX ADMIN — Medication 600 MILLIGRAM(S): at 09:23

## 2022-12-07 RX ADMIN — Medication 325 MILLIGRAM(S): at 20:37

## 2022-12-07 RX ADMIN — Medication 600 MILLIGRAM(S): at 08:48

## 2022-12-07 RX ADMIN — Medication 600 MILLIGRAM(S): at 23:49

## 2022-12-07 RX ADMIN — Medication 10 MILLIGRAM(S): at 04:00

## 2022-12-07 RX ADMIN — Medication 975 MILLIGRAM(S): at 03:17

## 2022-12-07 RX ADMIN — SIMETHICONE 80 MILLIGRAM(S): 80 TABLET, CHEWABLE ORAL at 10:03

## 2022-12-07 RX ADMIN — Medication 325 MILLIGRAM(S): at 05:21

## 2022-12-07 RX ADMIN — Medication 975 MILLIGRAM(S): at 21:30

## 2022-12-07 RX ADMIN — Medication 975 MILLIGRAM(S): at 04:18

## 2022-12-07 RX ADMIN — SIMETHICONE 80 MILLIGRAM(S): 80 TABLET, CHEWABLE ORAL at 01:55

## 2022-12-08 ENCOUNTER — TRANSCRIPTION ENCOUNTER (OUTPATIENT)
Age: 32
End: 2022-12-08

## 2022-12-08 VITALS
SYSTOLIC BLOOD PRESSURE: 108 MMHG | RESPIRATION RATE: 18 BRPM | OXYGEN SATURATION: 100 % | DIASTOLIC BLOOD PRESSURE: 60 MMHG | TEMPERATURE: 98 F | HEART RATE: 75 BPM

## 2022-12-08 RX ORDER — ACETAMINOPHEN 500 MG
3 TABLET ORAL
Qty: 0 | Refills: 0 | DISCHARGE
Start: 2022-12-08

## 2022-12-08 RX ORDER — LEVOTHYROXINE SODIUM 125 MCG
1 TABLET ORAL
Qty: 0 | Refills: 0 | DISCHARGE

## 2022-12-08 RX ORDER — LABETALOL HCL 100 MG
100 TABLET ORAL EVERY 12 HOURS
Refills: 0 | Status: DISCONTINUED | OUTPATIENT
Start: 2022-12-08 | End: 2022-12-08

## 2022-12-08 RX ORDER — LABETALOL HCL 100 MG
1 TABLET ORAL
Qty: 0 | Refills: 0 | DISCHARGE

## 2022-12-08 RX ORDER — LABETALOL HCL 100 MG
1 TABLET ORAL
Qty: 30 | Refills: 0
Start: 2022-12-08 | End: 2022-12-22

## 2022-12-08 RX ORDER — FERROUS SULFATE 325(65) MG
2 TABLET ORAL
Qty: 0 | Refills: 0 | DISCHARGE

## 2022-12-08 RX ORDER — LEVOTHYROXINE SODIUM 125 MCG
1 TABLET ORAL
Qty: 0 | Refills: 0 | DISCHARGE
Start: 2022-12-08

## 2022-12-08 RX ORDER — IBUPROFEN 200 MG
1 TABLET ORAL
Qty: 0 | Refills: 0 | DISCHARGE
Start: 2022-12-08

## 2022-12-08 RX ADMIN — Medication 975 MILLIGRAM(S): at 09:04

## 2022-12-08 RX ADMIN — Medication 100 MILLIGRAM(S): at 17:17

## 2022-12-08 RX ADMIN — Medication 600 MILLIGRAM(S): at 00:40

## 2022-12-08 RX ADMIN — Medication 975 MILLIGRAM(S): at 14:40

## 2022-12-08 RX ADMIN — Medication 600 MILLIGRAM(S): at 05:42

## 2022-12-08 RX ADMIN — Medication 600 MILLIGRAM(S): at 12:00

## 2022-12-08 RX ADMIN — HEPARIN SODIUM 5000 UNIT(S): 5000 INJECTION INTRAVENOUS; SUBCUTANEOUS at 09:04

## 2022-12-08 RX ADMIN — Medication 500 MILLIGRAM(S): at 11:30

## 2022-12-08 RX ADMIN — Medication 325 MILLIGRAM(S): at 14:40

## 2022-12-08 RX ADMIN — Medication 975 MILLIGRAM(S): at 03:25

## 2022-12-08 RX ADMIN — Medication 600 MILLIGRAM(S): at 11:30

## 2022-12-08 RX ADMIN — Medication 600 MILLIGRAM(S): at 06:26

## 2022-12-08 RX ADMIN — Medication 975 MILLIGRAM(S): at 09:34

## 2022-12-08 RX ADMIN — Medication 975 MILLIGRAM(S): at 15:20

## 2022-12-08 RX ADMIN — Medication 200 MILLIGRAM(S): at 05:43

## 2022-12-08 RX ADMIN — Medication 50 MICROGRAM(S): at 05:43

## 2022-12-08 RX ADMIN — Medication 975 MILLIGRAM(S): at 02:42

## 2022-12-08 RX ADMIN — SIMETHICONE 80 MILLIGRAM(S): 80 TABLET, CHEWABLE ORAL at 09:04

## 2022-12-08 RX ADMIN — Medication 325 MILLIGRAM(S): at 05:43

## 2022-12-08 RX ADMIN — Medication 600 MILLIGRAM(S): at 17:17

## 2022-12-08 NOTE — PROGRESS NOTE ADULT - ASSESSMENT
A/P: 32y POD#1 s/p rMTCS c/b elevated QBL (1074cc) in the setting of chronic anemia as well as cHTN. AM CBC returned notable for acute blood loss anemia (6.5/20.2).    - Continue regular diet  - Encourage ambulation  - Continue motrin, tylenol, oxycodone PRN for pain control.    #Acute blood loss anemia  - After discussion w/ attending, patient to be ordered for 2U of pRBCs w/ post-transfusion CBC. Pt not tachycardic, but BPs noted to be lower (90s/50s) compared to her baseline 120-130s/70-90s. Plan discussed with patient who was amendable to plan. Patient observed ambulating upon discussion of this and again, denied any overt light-headedness or dizziness  - Will start on Fe    #cHTN   - Denies any s/s of PEC  - BPs as noted above  - Is ordered for home Labetalol, but is being held given lower BPs     Gareth Martinez, PGY-1  Ob/Gyn    d/w Dr. Cross 
A/P: 32y POD#2 s/p rMTCS c/b elevated QBL (1074cc) in the setting of chronic anemia as well as cHTN. On POD#1 she had tachycardia, chills, elevated temp. Differential includes transfusion reaction, continued blood loss, and infection. HR is now wnl.     #Tachycardia  - HR improving  - Cardiology: sinus tachycardia  - CT shows no evidence of active bleed  - f/u BCx/UCx  - AM H/H stable    #CHTN  - Continue labetalol 200  - BP well controlled overnight    #Abdominal pain  - Pt now passing flatus and pain slowly improving  - Encourage ambulation    #PP  - Continue regular diet.  - Continue motrin, tylenol, oxycodone PRN for pain control.    Nicolle Tee MD  OB/GYN PGY-1
A/P: 32y POD#3 s/p rMTCS c/b elevated QBL (1074cc) in the setting of chronic anemia as well as cHTN. On POD#1 she had tachycardia, chills, elevated temp. Differential includes transfusion reaction, continued blood loss, and infection. HR is now wnl.     #Tachycardia  - HR improving  - Cardiology: sinus tachycardia  - CT shows no evidence of active bleed  - No evidence of infection from UCX  - BCx NGTD, f/u final    #CHTN  - Continue labetalol 200  - BP well controlled overnight    #Abdominal pain  - Pt now passing flatus and pain slowly improving  - Encourage ambulation    #PP  - Continue regular diet.  - Continue motrin, tylenol, oxycodone PRN for pain control.    Nicolle Tee MD  OB/GYN PGY-1

## 2022-12-08 NOTE — DISCHARGE NOTE OB - MEDICATION SUMMARY - MEDICATIONS TO TAKE
I will START or STAY ON the medications listed below when I get home from the hospital:    ibuprofen 600 mg oral tablet  -- 1 tab(s) by mouth every 6 hours, As Needed  -- Indication: For c/s    acetaminophen 325 mg oral tablet  -- 3 tab(s) by mouth every 6 hours, As Needed  -- Indication: For c/s    labetalol 100 mg oral tablet  -- 1 tab(s) by mouth every 12 hours. Please hold if B/P is 110/60 or lower.  Call MD to let her know  -- Indication: For CHTN    Prenatal 1 oral capsule  -- Indication: For c/s    levothyroxine 50 mcg (0.05 mg) oral tablet  -- 1 tab(s) by mouth once a day  -- Indication: For Hypothyroid

## 2022-12-08 NOTE — DISCHARGE NOTE OB - CARE PROVIDER_API CALL
Devorah Cross)  Obstetrics and Gynecology  [address], [address]  [place] [address]  Phone: [phone]  Fax: [phone]  Follow Up Time:

## 2022-12-08 NOTE — DISCHARGE NOTE OB - CARE PLAN
1 Principal Discharge DX:	Single delivery by  section  Assessment and plan of treatment:	Reg diet, iron rich foods, vaginal rest X 6 weeks  Secondary Diagnosis:	Iron deficiency anemia, unspecified

## 2022-12-08 NOTE — PROGRESS NOTE ADULT - SUBJECTIVE AND OBJECTIVE BOX
OB Postpartum Note:  Delivery, POD#3    S: 31yo POD#3 s/p LTCS. The patient feels well.  Pain is well controlled. She is tolerating a regular diet and passing flatus. She is voiding spontaneously, and ambulating without difficulty. Denies CP/SOB. Denies lightheadedness/dizziness. Denies N/V. Denies sxs of PEC: no headaches, visual changes, RUQ pain, and respiratory distress    O:  Vitals:  Vital Signs Last 24 Hrs  T(C): 36.8 (08 Dec 2022 10:05), Max: 37.1 (07 Dec 2022 21:31)  T(F): 98.2 (08 Dec 2022 10:05), Max: 98.8 (07 Dec 2022 21:31)  HR: 91 (08 Dec 2022 10:05) (80 - 102)  BP: 110/74 (08 Dec 2022 10:05) (96/59 - 130/85)  BP(mean): --  RR: 18 (08 Dec 2022 10:05) (18 - 19)  SpO2: 100% (08 Dec 2022 10:05) (99% - 100%)    Parameters below as of 08 Dec 2022 01:52  Patient On (Oxygen Delivery Method): room air        MEDICATIONS  (STANDING):  acetaminophen     Tablet .. 975 milliGRAM(s) Oral <User Schedule>  ascorbic acid 500 milliGRAM(s) Oral daily  diphtheria/tetanus/pertussis (acellular) Vaccine (Adacel) 0.5 milliLiter(s) IntraMuscular once  ferrous    sulfate 325 milliGRAM(s) Oral three times a day  heparin   Injectable 5000 Unit(s) SubCutaneous every 12 hours  ibuprofen  Tablet. 600 milliGRAM(s) Oral every 6 hours  influenza   Vaccine 0.5 milliLiter(s) IntraMuscular once  labetalol 200 milliGRAM(s) Oral every 12 hours  lactated ringers Bolus 500 milliLiter(s) IV Bolus once  lactated ringers Bolus 500 milliLiter(s) IV Bolus once  levothyroxine 50 MICROGram(s) Oral daily  senna 2 Tablet(s) Oral at bedtime    MEDICATIONS  (PRN):  bisacodyl Suppository 10 milliGRAM(s) Rectal daily PRN Constipation  diphenhydrAMINE 25 milliGRAM(s) Oral every 6 hours PRN Pruritus  lanolin Ointment 1 Application(s) Topical every 6 hours PRN Sore Nipples  magnesium hydroxide Suspension 30 milliLiter(s) Oral two times a day PRN Constipation  oxyCODONE    IR 5 milliGRAM(s) Oral every 3 hours PRN Moderate to Severe Pain (4-10)  oxyCODONE    IR 5 milliGRAM(s) Oral once PRN Moderate to Severe Pain (4-10)  simethicone 80 milliGRAM(s) Chew every 4 hours PRN Gas      LABS:  Blood type: B Positive  Rubella IgG: RPR: Negative                          7.3<L>   15.79<H> >-----------< 318    (  @ 06:05 )             23.1<L>                        7.0<LL>   15.88<H> >-----------< 263    (  @ 16:00 )             21.7<L>                        7.9<L>   10.97<H> >-----------< 304    (  @ 10:50 )             26.0<L>                        6.5<LL>   14.38<H> >-----------< 266    (  @ 05:20 )             20.2<LL>                        8.1<L>   17.48<H> >-----------< 317    (  @ 18:39 )             25.8<L>                        8.1<L>   17.39<H> >-----------< 318    (  @ 14:25 )             25.3<L>    22 @ 10:50      133<L>  |  98  |  7   ----------------------------<  88  4.9   |  17<L>  |  0.63        Ca    8.9      06 Dec 2022 10:50    TPro  5.9<L>  /  Alb  3.1<L>  /  TBili  2.0<H>  /  DBili  x   /  AST  38<H>  /  ALT  12  /  AlkPhos  59  22 @ 10:50    BCX (): NGTD  UCX (12/6): NG      Physical exam:  Gen: NAD  Abdomen: Soft, nontender, no distension , firm uterine fundus at umbilicus.  Incision: Clean, dry, and intact   Pelvic: Normal lochia noted  Ext: No calf tenderness
Postop Day  __1_ s/p   C- Section    THERAPY:  [ x ] Spinal morphine   [  ] Epidural morphine   [  ] IV PCA Hydromorphone 1 mg/ml      Sedation Score:	  [ x ] Alert	    [  ] Drowsy        [  ] Arousable	[  ] Asleep	[  ] Unresponsive    Side Effects:	  [ x ] None	     [  ] Nausea        [  ] Pruritus        [  ] Weakness   [  ] Numbness        ASSESSMENT/ PLAN   [   ] Discontinue         [  ] Continue  [ x ]Documentation and Verification of current medications     Comments:       Patient is doing well.  OOBAA. Tolerating clears.  Pain is tolerable.  No residual anesthetic issues or complications noted.  
Asked by Dr. Cross to discharge this patient.  Patient requesting discharge, and spoke with Dr. Cross via telephone.  This patient has CHTN and is on Labetalol 200mg Q 12H  Was seen by Cardio OB today , who recommended we decrease the Labetalol to 100mg Q 12H, which Dr. Cross agreed with.  Changes to medication dosage made and was sent to the [facility].    This patient has a Blood Pressure Monitor at home and is familiar with B/P monitoring.   Reviewed the criteria for monitoring and reporting her B/P's.  The patient verbalized understanding of the information given.  For follow up with Dr. Cross in the office in 1 week.      
OB Progress Note:  Delivery, POD#1    S: 32y POD#1 s/p rMTCS c/b elevated QBL (1074cc) in the setting of chronic anemia as well as cHTN. Pain controlled. Tolerating a regular diet. Passing flatus Denies n/v, chest pain, shortness of breath, or lightheadedness/dizziness (denies sx w/ ambulating as well). Voiding spontaneously and ambulating. Denies headaches or scotomas     O:   Vital Signs Last 24 Hrs  T(C): 36.9 (06 Dec 2022 05:28), Max: 36.9 (06 Dec 2022 05:28)  T(F): 98.5 (06 Dec 2022 05:28), Max: 98.5 (06 Dec 2022 05:)  HR: 99 (06 Dec 2022 05:28) (62 - 99)  BP: 95/54 (06 Dec 2022 05:28) (90/54 - 133/91)  BP(mean): 75 (05 Dec 2022 21:30) (75 - 101)  RR: 18 (06 Dec 2022 05:28) (13 - 24)  SpO2: 100% (06 Dec 2022 05:28) (99% - 100%)    Parameters below as of 06 Dec 2022 05:28  Patient On (Oxygen Delivery Method): room air        Labs:  Blood type: B Positive  Rubella IgG: RPR: Negative                          6.5<LL>   14.38<H> >-----------< 266    (  @ 05:20 )             20.2<LL>                        8.1<L>   17.48<H> >-----------< 317    (  @ 18:39 )             25.8<L>                        8.1<L>   17.39<H> >-----------< 318    (  @ 14:25 )             25.3<L>                        9.0<L>   8.08 >-----------< 328    (  @ 06:10 )             28.9<L>    - @ 06:10      133<L>  |  103  |  6<L>  ----------------------------<  81  4.2   |  20<L>  |  0.45<L>        Ca    9.0      05 Dec 2022 06:10    TPro  6.6  /  Alb  3.4  /  TBili  0.4  /  DBili  x   /  AST  13  /  ALT  7   /  AlkPhos  55  22 @ 06:10          PE:  General: No acute distress. Sitting up in bed comfortably prior to eval   Pulm: Unlabored breathing. No respiratory distress  Abdomen: Soft. Non-tender. Incision c/d/i.    Extremities: No calf tenderness bilaterally     
OB Progress Note: LTCS, POD#2    S: 31yo POD#2 s/p rLTCS. She denies chills and lip numbness/tingling. Abdominal pain is improving She is tolerating a regular diet and passing flatus. She is voiding spontaneously, and ambulating without difficulty. Denies CP/SOB. Denies lightheadedness/dizziness. Denies N/V. Denies sxs od PEC: denies headache, visual changes, RUQ pain, respiratory distress    O:  Vitals:  Vital Signs Last 24 Hrs  T(C): 36.9 (07 Dec 2022 05:25), Max: 37.8 (06 Dec 2022 10:30)  T(F): 98.4 (07 Dec 2022 05:25), Max: 100.1 (06 Dec 2022 10:30)  HR: 95 (07 Dec 2022 05:25) (85 - 139)  BP: 125/87 (07 Dec 2022 05:25) (93/56 - 145/97)  BP(mean): --  RR: 17 (07 Dec 2022 05:25) (16 - 40)  SpO2: 99% (07 Dec 2022 05:25) (97% - 100%)    Parameters below as of 07 Dec 2022 05:25  Patient On (Oxygen Delivery Method): room air        MEDICATIONS  (STANDING):  acetaminophen     Tablet .. 975 milliGRAM(s) Oral <User Schedule>  ascorbic acid 500 milliGRAM(s) Oral daily  diphtheria/tetanus/pertussis (acellular) Vaccine (Adacel) 0.5 milliLiter(s) IntraMuscular once  ferrous    sulfate 325 milliGRAM(s) Oral three times a day  heparin   Injectable 5000 Unit(s) SubCutaneous every 12 hours  ibuprofen  Tablet. 600 milliGRAM(s) Oral every 6 hours  influenza   Vaccine 0.5 milliLiter(s) IntraMuscular once  labetalol 200 milliGRAM(s) Oral every 12 hours  lactated ringers Bolus 500 milliLiter(s) IV Bolus once  lactated ringers Bolus 500 milliLiter(s) IV Bolus once  levothyroxine 50 MICROGram(s) Oral daily  senna 2 Tablet(s) Oral at bedtime      MEDICATIONS  (PRN):  bisacodyl Suppository 10 milliGRAM(s) Rectal daily PRN Constipation  diphenhydrAMINE 25 milliGRAM(s) Oral every 6 hours PRN Pruritus  lanolin Ointment 1 Application(s) Topical every 6 hours PRN Sore Nipples  magnesium hydroxide Suspension 30 milliLiter(s) Oral two times a day PRN Constipation  oxyCODONE    IR 5 milliGRAM(s) Oral every 3 hours PRN Moderate to Severe Pain (4-10)  oxyCODONE    IR 5 milliGRAM(s) Oral once PRN Moderate to Severe Pain (4-10)  simethicone 80 milliGRAM(s) Chew every 4 hours PRN Gas      Labs:  Blood type: B Positive  Rubella IgG: RPR: Negative                          7.3<L>   15.79<H> >-----------< 318    ( 12-07 @ 06:05 )             23.1<L>                        7.0<LL>   15.88<H> >-----------< 263    ( 12-06 @ 16:00 )             21.7<L>                        7.9<L>   10.97<H> >-----------< 304    ( 12-06 @ 10:50 )             26.0<L>                        6.5<LL>   14.38<H> >-----------< 266    ( 12-06 @ 05:20 )             20.2<LL>                        8.1<L>   17.48<H> >-----------< 317    ( 12-05 @ 18:39 )             25.8<L>                        8.1<L>   17.39<H> >-----------< 318    ( 12-05 @ 14:25 )             25.3<L>                        9.0<L>   8.08 >-----------< 328    ( 12-05 @ 06:10 )             28.9<L>    12-06-22 @ 10:50      133<L>  |  98  |  7   ----------------------------<  88  4.9   |  17<L>  |  0.63    12-05-22 @ 06:10      133<L>  |  103  |  6<L>  ----------------------------<  81  4.2   |  20<L>  |  0.45<L>        Ca    8.9      06 Dec 2022 10:50  Ca    9.0      05 Dec 2022 06:10    TPro  5.9<L>  /  Alb  3.1<L>  /  TBili  2.0<H>  /  DBili  x   /  AST  38<H>  /  ALT  12  /  AlkPhos  59  12-06-22 @ 10:50  TPro  6.6  /  Alb  3.4  /  TBili  0.4  /  DBili  x   /  AST  13  /  ALT  7   /  AlkPhos  55  12-05-22 @ 06:10          PE:  General: NAD  Abdomen: Soft, appropriately tender, incision c/d/i.  Extremities: No erythema, no pitting edema    EKG 12/6 11:05:06: atrial flutter with 2:1 AV conduction, abnormal ECG  EKG 12/6 11:05:50: sinus tachycardia, otherwise normal ECG  CT Angio A/P (12/6)- Mild enlargement of the left rectus muscle consistent with intramuscular hematoma. No evidence of active bleed. Fluid distended loops of small bowel and colon, likely ileus.    
pt is hemodynamiccly stable    flatus positive   she is ambulating and tolerating food well    pain is well controlled with pain med   she was coded when she was recieveing her blood transfusion   and after that she was seen  by a cardiologist also   as she is stable   the plan is to sent her home on labetalol  and to monitor her bp    and she was adv to f/up in 1 wk arnaud patrick md
pt is here for rc/s   she is 37 wks and she does have htn and she is on labetalol   bp is well controlled    she does have hx of anemia and she does received iron transfusion during antepartum   last HB is 9 done on 12/3/22   she does have  multiple fibroid and largest fibroid is 8 cm and different locations and she also have a lower ut fibroid which is 3 cm   pt was counseled in detailed about the risk of c/s risk of infetction , bleeding , blood transfusion , risk of injury to bowel bladder or any other organ near by the surgery site and in case of emergency any procedure deemed necessary can be proceeds and she agreed    consent obtained   melissa patrick md
pt is s/p RLTCS day #2   asymptomatic anemia   ileus   multiple fibroids   chronic hypertension   she has reaction to blood transfusion     she said she is feeling better then yesterday    she pass flatus   on exam    pulse is 101 now   bp is normal range   afebrile     breast on exam nml b/l   abd is soft but distended    bowel sounds hypoactive   incision dry clean and intact   uterus is firm   no calf tenderness b/l   a/p   will encourage ambulation   continue senna,   will continue pain med   and iron tabs   h/h stable   routine post op care    arnaud patrick md

## 2022-12-08 NOTE — DISCHARGE NOTE OB - NS MD DC FALL RISK RISK
For information on Fall & Injury Prevention, visit: [email or web address] OR  [email or web address] OR  https://www.cdc.gov/steadi/patient.html

## 2022-12-08 NOTE — DISCHARGE NOTE OB - PATIENT PORTAL LINK FT
You can access the FollowMyHealth Patient Portal offered by [facility] by registering at the following website: [email or web address]. By joining [employer]’s FollowMyHealth portal, you will also be able to view your health information using other applications (apps) compatible with our system.

## 2022-12-09 ENCOUNTER — APPOINTMENT (OUTPATIENT)
Dept: ANTEPARTUM | Facility: CLINIC | Age: 32
End: 2022-12-09

## 2022-12-09 ENCOUNTER — NON-APPOINTMENT (OUTPATIENT)
Age: 32
End: 2022-12-09

## 2022-12-09 DIAGNOSIS — O10.919 UNSPECIFIED PRE-EXISTING HYPERTENSION COMPLICATING PREGNANCY, UNSPECIFIED TRIMESTER: ICD-10-CM

## 2022-12-09 PROBLEM — Z34.90 ENCOUNTER FOR SUPERVISION OF NORMAL PREGNANCY, UNSPECIFIED, UNSPECIFIED TRIMESTER: Chronic | Status: ACTIVE | Noted: 2022-11-30

## 2022-12-09 PROBLEM — D64.9 ANEMIA, UNSPECIFIED: Chronic | Status: ACTIVE | Noted: 2022-11-30

## 2022-12-09 PROBLEM — D21.9 BENIGN NEOPLASM OF CONNECTIVE AND OTHER SOFT TISSUE, UNSPECIFIED: Chronic | Status: ACTIVE | Noted: 2022-11-30

## 2022-12-09 RX ORDER — PRENATAL VIT NO.130/IRON/FOLIC 27MG-0.8MG
27-0.8 TABLET ORAL DAILY
Qty: 30 | Refills: 1 | Status: ACTIVE | COMMUNITY
Start: 2022-12-09

## 2022-12-09 RX ORDER — IBUPROFEN 600 MG/1
600 TABLET, FILM COATED ORAL EVERY 6 HOURS
Qty: 56 | Refills: 0 | Status: ACTIVE | COMMUNITY
Start: 2022-12-09

## 2022-12-09 RX ORDER — LEVOTHYROXINE SODIUM 0.07 MG/1
75 TABLET ORAL
Refills: 0 | Status: DISCONTINUED | COMMUNITY
End: 2022-12-09

## 2022-12-09 RX ORDER — ACETAMINOPHEN 325 MG/1
325 TABLET ORAL
Refills: 0 | Status: ACTIVE | COMMUNITY
Start: 2022-12-09

## 2022-12-10 ENCOUNTER — TRANSCRIPTION ENCOUNTER (OUTPATIENT)
Age: 32
End: 2022-12-10

## 2022-12-11 LAB
CULTURE RESULTS: SIGNIFICANT CHANGE UP
CULTURE RESULTS: SIGNIFICANT CHANGE UP
SPECIMEN SOURCE: SIGNIFICANT CHANGE UP
SPECIMEN SOURCE: SIGNIFICANT CHANGE UP

## 2022-12-12 ENCOUNTER — NON-APPOINTMENT (OUTPATIENT)
Age: 32
End: 2022-12-12

## 2022-12-13 ENCOUNTER — NON-APPOINTMENT (OUTPATIENT)
Age: 32
End: 2022-12-13

## 2022-12-14 ENCOUNTER — NON-APPOINTMENT (OUTPATIENT)
Age: 32
End: 2022-12-14

## 2022-12-15 LAB — SURGICAL PATHOLOGY STUDY: SIGNIFICANT CHANGE UP

## 2022-12-16 ENCOUNTER — NON-APPOINTMENT (OUTPATIENT)
Age: 32
End: 2022-12-16

## 2022-12-16 ENCOUNTER — EMERGENCY (EMERGENCY)
Facility: HOSPITAL | Age: 32
LOS: 1 days | Discharge: HOME | End: 2022-12-16
Attending: STUDENT IN AN ORGANIZED HEALTH CARE EDUCATION/TRAINING PROGRAM
Payer: MEDICAID

## 2022-12-16 VITALS
SYSTOLIC BLOOD PRESSURE: 155 MMHG | HEART RATE: 72 BPM | TEMPERATURE: 98 F | OXYGEN SATURATION: 98 % | WEIGHT: 147.93 LBS | HEIGHT: 58 IN | RESPIRATION RATE: 18 BRPM | DIASTOLIC BLOOD PRESSURE: 83 MMHG

## 2022-12-16 PROCEDURE — 99282 EMERGENCY DEPT VISIT SF MDM: CPT

## 2022-12-16 NOTE — ED PROVIDER NOTE - PATIENT PORTAL LINK FT
You can access the FollowMyHealth Patient Portal offered by [facility] by registering at the following website: [email or web address]. By joining [employer]’s FollowMyHealth portal, you will also be able to view your health information using other applications (apps) compatible with our system.

## 2022-12-16 NOTE — ED PROVIDER NOTE - NSFOLLOWUPCLINICS_GEN_ALL_ED_FT
[facility]  [address] [place] [address]  Phone: [phone]  Fax: [phone]  Follow Up Time: 1-3 Days

## 2022-12-16 NOTE — ED PROVIDER NOTE - NSFOLLOWUPINSTRUCTIONS_ED_ALL_ED_FT
You were seen in the emergency room today for concern about the  site. Please call your OB/GYN doctor to inform them of this ER visit and obtain the next available appointment within the next 3 days. As we discussed, return to the ER if you have any worsening symptoms.    We no longer feel that you need further emergency care or admission to the hospital at this time.    While we have determined that you are currently stable for discharge, we know that things can change. Please seek immediate medical attention or return to the ER if you experience any of the following:  Any worsening or persistent symptoms  Severe Pain  Chest Pain  Difficulty Breathing  Bleeding  Passing Out  Severe Rash  Inability to Eat or Drink  Persistent Fever    Please see a primary care doctor or specialist within 3 days to ensure that you are improving.    Please call the [facility] phone numbers on this document if you have any problems obtaining a follow up appointment.    I wish you well! -Dr Pringle

## 2022-12-16 NOTE — ED ADULT TRIAGE NOTE - CHIEF COMPLAINT QUOTE
Pt stated she had  on Dec 5, 22, c/o bleeding from the incision site, noticed blood oozing out from Steri-strips.

## 2022-12-16 NOTE — ED PROVIDER NOTE - CLINICAL SUMMARY MEDICAL DECISION MAKING FREE TEXT BOX
Pt p/w concern about blood seen on steri strips of  site performed 11 days ago with no other associated symptoms. On exam pt has an approx 3mm defect in an otherwise healed  incision with no bleeding currently. Applied surgicel with bandage and rec OB f/u within 3 days. Discussed with pt my clinical impression and results, patient given strict return precautions if persistent or worsening of symptoms occurs, and need for close follow up. Pt expressed understanding and agrees with plan. Pt is well appearing with a reassuring exam. Discharge home with PMD or Specialist f/u within 3 days.

## 2022-12-16 NOTE — ED PROVIDER NOTE - PHYSICAL EXAMINATION
Vital Signs Reviewed  GEN: Comfortable, NAD, AAOx3  HEENT: NCAT, MMM, Neck Supple  RESP: CTAB, No rales/rhonchi/wheezing  CV: RRR, S1S2, No murmurs  ABD: Low horizantal incision completely healed   except for approx 3 mm defect with no active bleeding, no fluctuance, no TTP.    Soft, ND, No masses, No CVA Tenderness  Extrem/Skin: Equal pulses bilat, No cyanosis/edema/rashes  Neuro: No focal deficits

## 2022-12-19 ENCOUNTER — NON-APPOINTMENT (OUTPATIENT)
Age: 32
End: 2022-12-19

## 2022-12-20 NOTE — OB PST NOTE - NSOTHERRISK_OBGYN_A_OB
Please follow-up with [facility] and your Pediatrician for continued management of your burn.  Keep clean/dry. Clean with warm, simple soapy water once a day then when dry cover with Bacitracin antibiotic ointment and non-adherent gauze. Perform this daily.   Motrin or Tylenol for pain.  Come back to the ED for any worsening pain/symptoms, fevers, signs of infection (redness, swelling, pus), or weakness.   None

## 2022-12-24 ENCOUNTER — EMERGENCY (EMERGENCY)
Facility: HOSPITAL | Age: 32
LOS: 1 days | Discharge: STILL ADMITTED OR ADMINISTRATIVE | End: 2022-12-24
Admitting: EMERGENCY MEDICINE

## 2022-12-24 ENCOUNTER — INPATIENT (INPATIENT)
Facility: HOSPITAL | Age: 32
LOS: 1 days | Discharge: HOME | End: 2022-12-26
Attending: OBSTETRICS & GYNECOLOGY | Admitting: OBSTETRICS & GYNECOLOGY

## 2022-12-24 ENCOUNTER — TRANSCRIPTION ENCOUNTER (OUTPATIENT)
Age: 32
End: 2022-12-24

## 2022-12-24 VITALS — HEART RATE: 88 BPM

## 2022-12-24 VITALS
HEART RATE: 66 BPM | DIASTOLIC BLOOD PRESSURE: 94 MMHG | TEMPERATURE: 98 F | HEIGHT: 58 IN | OXYGEN SATURATION: 99 % | SYSTOLIC BLOOD PRESSURE: 164 MMHG | RESPIRATION RATE: 18 BRPM

## 2022-12-24 DIAGNOSIS — O11.9 PRE-EXISTING HYPERTENSION WITH PRE-ECLAMPSIA, UNSPECIFIED TRIMESTER: ICD-10-CM

## 2022-12-24 DIAGNOSIS — O16.9 UNSPECIFIED MATERNAL HYPERTENSION, UNSPECIFIED TRIMESTER: ICD-10-CM

## 2022-12-24 DIAGNOSIS — Z98.891 HISTORY OF UTERINE SCAR FROM PREVIOUS SURGERY: Chronic | ICD-10-CM

## 2022-12-24 LAB
ALBUMIN SERPL ELPH-MCNC: 4.2 G/DL — SIGNIFICANT CHANGE UP (ref 3.3–5)
ALP SERPL-CCNC: 62 U/L — SIGNIFICANT CHANGE UP (ref 40–120)
ALT FLD-CCNC: 13 U/L — SIGNIFICANT CHANGE UP (ref 4–33)
ANION GAP SERPL CALC-SCNC: 12 MMOL/L — SIGNIFICANT CHANGE UP (ref 7–14)
APTT BLD: 31.8 SEC — SIGNIFICANT CHANGE UP (ref 27–36.3)
AST SERPL-CCNC: 15 U/L — SIGNIFICANT CHANGE UP (ref 4–32)
BASOPHILS # BLD AUTO: 0.03 K/UL — SIGNIFICANT CHANGE UP (ref 0–0.2)
BASOPHILS NFR BLD AUTO: 0.5 % — SIGNIFICANT CHANGE UP (ref 0–2)
BILIRUB SERPL-MCNC: 0.5 MG/DL — SIGNIFICANT CHANGE UP (ref 0.2–1.2)
BUN SERPL-MCNC: 7 MG/DL — SIGNIFICANT CHANGE UP (ref 7–23)
CALCIUM SERPL-MCNC: 9.5 MG/DL — SIGNIFICANT CHANGE UP (ref 8.4–10.5)
CHLORIDE SERPL-SCNC: 102 MMOL/L — SIGNIFICANT CHANGE UP (ref 98–107)
CO2 SERPL-SCNC: 27 MMOL/L — SIGNIFICANT CHANGE UP (ref 22–31)
CREAT SERPL-MCNC: 0.49 MG/DL — LOW (ref 0.5–1.3)
EGFR: 127 ML/MIN/1.73M2 — SIGNIFICANT CHANGE UP
EOSINOPHIL # BLD AUTO: 0.18 K/UL — SIGNIFICANT CHANGE UP (ref 0–0.5)
EOSINOPHIL NFR BLD AUTO: 3 % — SIGNIFICANT CHANGE UP (ref 0–6)
FIBRINOGEN PPP-MCNC: 321 MG/DL — SIGNIFICANT CHANGE UP (ref 200–465)
GLUCOSE SERPL-MCNC: 103 MG/DL — HIGH (ref 70–99)
HCT VFR BLD CALC: 28.5 % — LOW (ref 34.5–45)
HGB BLD-MCNC: 8.5 G/DL — LOW (ref 11.5–15.5)
IANC: 3.72 K/UL — SIGNIFICANT CHANGE UP (ref 1.8–7.4)
IMM GRANULOCYTES NFR BLD AUTO: 0.3 % — SIGNIFICANT CHANGE UP (ref 0–0.9)
INR BLD: 1.08 RATIO — SIGNIFICANT CHANGE UP (ref 0.88–1.16)
LDH SERPL L TO P-CCNC: 191 U/L — SIGNIFICANT CHANGE UP (ref 135–225)
LYMPHOCYTES # BLD AUTO: 1.64 K/UL — SIGNIFICANT CHANGE UP (ref 1–3.3)
LYMPHOCYTES # BLD AUTO: 26.9 % — SIGNIFICANT CHANGE UP (ref 13–44)
MCHC RBC-ENTMCNC: 20.1 PG — LOW (ref 27–34)
MCHC RBC-ENTMCNC: 29.8 GM/DL — LOW (ref 32–36)
MCV RBC AUTO: 67.4 FL — LOW (ref 80–100)
MONOCYTES # BLD AUTO: 0.51 K/UL — SIGNIFICANT CHANGE UP (ref 0–0.9)
MONOCYTES NFR BLD AUTO: 8.4 % — SIGNIFICANT CHANGE UP (ref 2–14)
NEUTROPHILS # BLD AUTO: 3.72 K/UL — SIGNIFICANT CHANGE UP (ref 1.8–7.4)
NEUTROPHILS NFR BLD AUTO: 60.9 % — SIGNIFICANT CHANGE UP (ref 43–77)
NRBC # BLD: 0 /100 WBCS — SIGNIFICANT CHANGE UP (ref 0–0)
NRBC # FLD: 0 K/UL — SIGNIFICANT CHANGE UP (ref 0–0)
PLATELET # BLD AUTO: 620 K/UL — HIGH (ref 150–400)
POTASSIUM SERPL-MCNC: 3 MMOL/L — LOW (ref 3.5–5.3)
POTASSIUM SERPL-SCNC: 3 MMOL/L — LOW (ref 3.5–5.3)
PROT SERPL-MCNC: 7.4 G/DL — SIGNIFICANT CHANGE UP (ref 6–8.3)
PROTHROM AB SERPL-ACNC: 12.5 SEC — SIGNIFICANT CHANGE UP (ref 10.5–13.4)
RBC # BLD: 4.23 M/UL — SIGNIFICANT CHANGE UP (ref 3.8–5.2)
RBC # FLD: 16.4 % — HIGH (ref 10.3–14.5)
SODIUM SERPL-SCNC: 141 MMOL/L — SIGNIFICANT CHANGE UP (ref 135–145)
URATE SERPL-MCNC: 4.1 MG/DL — SIGNIFICANT CHANGE UP (ref 2.5–7)
WBC # BLD: 6.1 K/UL — SIGNIFICANT CHANGE UP (ref 3.8–10.5)
WBC # FLD AUTO: 6.1 K/UL — SIGNIFICANT CHANGE UP (ref 3.8–10.5)

## 2022-12-24 PROCEDURE — L9996: CPT

## 2022-12-24 RX ORDER — ACETAMINOPHEN 500 MG
650 TABLET ORAL EVERY 6 HOURS
Refills: 0 | Status: DISCONTINUED | OUTPATIENT
Start: 2022-12-24 | End: 2022-12-26

## 2022-12-24 RX ORDER — NIFEDIPINE 30 MG
20 TABLET, EXTENDED RELEASE 24 HR ORAL ONCE
Refills: 0 | Status: COMPLETED | OUTPATIENT
Start: 2022-12-24 | End: 2022-12-24

## 2022-12-24 RX ORDER — SODIUM CHLORIDE 9 MG/ML
500 INJECTION, SOLUTION INTRAVENOUS ONCE
Refills: 0 | Status: COMPLETED | OUTPATIENT
Start: 2022-12-24 | End: 2022-12-24

## 2022-12-24 RX ORDER — LABETALOL HCL 100 MG
300 TABLET ORAL
Qty: 0 | Refills: 0 | DISCHARGE

## 2022-12-24 RX ORDER — IBUPROFEN 200 MG
600 TABLET ORAL EVERY 6 HOURS
Refills: 0 | Status: DISCONTINUED | OUTPATIENT
Start: 2022-12-24 | End: 2022-12-26

## 2022-12-24 RX ORDER — HEPARIN SODIUM 5000 [USP'U]/ML
5000 INJECTION INTRAVENOUS; SUBCUTANEOUS EVERY 12 HOURS
Refills: 0 | Status: DISCONTINUED | OUTPATIENT
Start: 2022-12-24 | End: 2022-12-26

## 2022-12-24 RX ORDER — NIFEDIPINE 30 MG
90 TABLET, EXTENDED RELEASE 24 HR ORAL DAILY
Refills: 0 | Status: DISCONTINUED | OUTPATIENT
Start: 2022-12-25 | End: 2022-12-25

## 2022-12-24 RX ORDER — LABETALOL HCL 100 MG
300 TABLET ORAL EVERY 8 HOURS
Refills: 0 | Status: DISCONTINUED | OUTPATIENT
Start: 2022-12-24 | End: 2022-12-26

## 2022-12-24 RX ORDER — POTASSIUM CHLORIDE 20 MEQ
40 PACKET (EA) ORAL EVERY 4 HOURS
Refills: 0 | Status: COMPLETED | OUTPATIENT
Start: 2022-12-24 | End: 2022-12-25

## 2022-12-24 RX ORDER — NIFEDIPINE 30 MG
10 TABLET, EXTENDED RELEASE 24 HR ORAL ONCE
Refills: 0 | Status: COMPLETED | OUTPATIENT
Start: 2022-12-24 | End: 2022-12-24

## 2022-12-24 RX ORDER — LEVOTHYROXINE SODIUM 125 MCG
50 TABLET ORAL DAILY
Refills: 0 | Status: DISCONTINUED | OUTPATIENT
Start: 2022-12-24 | End: 2022-12-26

## 2022-12-24 RX ADMIN — SODIUM CHLORIDE 500 MILLILITER(S): 9 INJECTION, SOLUTION INTRAVENOUS at 21:45

## 2022-12-24 RX ADMIN — Medication 40 MILLIEQUIVALENT(S): at 23:09

## 2022-12-24 RX ADMIN — Medication 10 MILLIGRAM(S): at 20:40

## 2022-12-24 RX ADMIN — Medication 20 MILLIGRAM(S): at 21:18

## 2022-12-24 NOTE — OB POSTPARTUM TRIAGE NOTE - TRANSFUSION HX COMMENT, PROFILE
2015 [facility] s/p c- section 12/5/2022 C/S - BRYAN, 2015 [facility] s/p c- section

## 2022-12-24 NOTE — OB POSTPARTUM TRIAGE NOTE - HISTORY OF PRESENT ILLNESS
31 y/o  with CHTN s/p repeat C/S on  presents today for elevated blood pressure at home of 190/120, dull epigastric pain (4/10 in severity) and lightheadedness. Patient was admitted at [facility] for severe range blood pressures and headache on  and was discharged today. Patient was given magnesium for 24 hours from  4am to  4am. Patient takes Labetalol 300mg TID and procardia 60mg QD.   AP complicated by PPH d/t large uterine fibroids with a reaction to blood transfusion. Patient states she became to shake, have a fever and elevated blood pressures with the blood transfusion which was then discontinued. Patient was also admitted twice during this pregnancy for  uterine contractions s/p Indocin x 2 days at 23 weeks and 30 weeks.    Med Hx: hypothyroidism, thalassemia minor, anemia  Meds:   OBGYN Hx: denies hx of fibroids, ovarian cysts, abnormal pap smear, STDs  Surg Hx:  Allergies: 31 y/o  with CHTN s/p repeat C/S on  presents today for elevated blood pressure at home of 190/120, dull epigastric pain (4/10 in severity) and lightheadedness. Patient was admitted at [facility] for severe range blood pressures and headache on  and was discharged today. Patient was given magnesium for 24 hours from  4am to  4am. Patient takes Labetalol 300mg TID and procardia 60mg QD.   AP complicated by PPH d/t large uterine fibroids with a reaction to blood transfusion. Patient states she became to shake, have a fever and elevated blood pressures with the blood transfusion which was then discontinued. Patient was also admitted twice during this pregnancy for  uterine contractions s/p Indocin x 2 days at 23 weeks and 30 weeks.     Med Hx: hypothyroidism, thalassemia minor, migraine, anemia  Meds:  Labetalol 300mg TID, Procardia 60mg QD, rizatriptan, iron, vitamin C   OBGYN Hx: denies hx of ovarian cysts, abnormal pap smear, STDs  Surg Hx:  Allergies: 33 y/o  with CHTN s/p repeat C/S on  presents today for elevated blood pressure at home of 190/120, dull epigastric pain (4/10 in severity) and lightheadedness. Patient was admitted at [facility] for severe range blood pressures and headache on  and was discharged today. Patient was given magnesium for 24 hours from  4am to  4am. Patient takes Labetalol 300mg TID and procardia 60mg QD.   AP complicated by PPH d/t large uterine fibroids with a reaction to blood transfusion. Patient states she became to shake, have a fever and elevated blood pressures with the blood transfusion which was then discontinued. Patient was also admitted twice during this pregnancy for  uterine contractions s/p Indocin x 2 days at 23 weeks and 30 weeks.     Med Hx: hypothyroidism, thalassemia minor, migraine, anemia  Meds:  Labetalol 300mg TID, Procardia 60mg QD, rizatriptan, iron, vitamin C   OBGYN Hx:   denies hx of ovarian cysts, abnormal pap smear, STDs  Surg Hx: C/S x 2 2015  Allergies: 33 y/o  with CHTN s/p repeat C/S on  presents today for elevated blood pressure at home of 190/120, dull epigastric pain (4/10 in severity) and lightheadedness. Patient was admitted at [facility] for severe range blood pressures and headache on  and was discharged today. Patient was given magnesium for 24 hours from  4am to  4am. Patient takes Labetalol 300mg TID (last dose taken at 2pm) and procardia 60mg QD (taken at 6am). Currently denies any headache, visual disturbances, lower extremity edema.  AP complicated by PPH during C/S d/t large uterine fibroids with a reaction to blood transfusion. Patient states she began to shake, had a fever and elevated blood pressures with the blood transfusion which was then discontinued. Patient was also admitted twice during this pregnancy for  uterine contractions s/p Indocin x 2 days at 23 weeks and 30 weeks.     Med Hx: hypothyroidism, thalassemia minor, migraine, anemia  Meds:  Labetalol 300mg TID, Procardia 60mg QD, rizatriptan, iron, vitamin C   OBGYN Hx: Repeat C/S 2022 - [facility] d/t large fibroids with adverse reaction to blood transfusion (shaking, fever, elevated BP) 37.5 weeks 5#7  C/S 10/25/2015 - arrest of dilation, chorio 40 weeks 6#10   denies hx of ovarian cysts, abnormal pap smear, STDs  Surg Hx: C/S x 2 2015  Allergies: NKDA 31 y/o  with CHTN s/p repeat C/S on  presents today for elevated blood pressure at home of 190/120, dull epigastric pain (4/10 in severity) and lightheadedness. Patient was admitted at [facility] for severe range blood pressures and headache on  and was discharged today. Patient was given magnesium for 24 hours from  4am to  4am. Patient takes Labetalol 300mg TID (last dose taken at 2pm) and procardia 60mg QD (taken at 6am). Currently denies any headache, visual disturbances, lower extremity edema.  AP complicated by PPH during C/S d/t large uterine fibroids with a reaction to blood transfusion. Patient states she began to shake, had a fever and elevated blood pressures with the blood transfusion which was then discontinued. Patient was also admitted twice during this pregnancy for  uterine contractions s/p Indocin x 2 days at 23 weeks and 30 weeks.     Med Hx: CHTN, hypothyroidism, thalassemia minor, migraine, anemia  Meds:  Labetalol 300mg TID, Procardia 60mg QD, levothyroxine 50 mcg QD, rizatriptan, iron, vitamin C   OBGYN Hx: Repeat C/S 2022 - [facility] d/t large fibroids with adverse reaction to blood transfusion (shaking, fever, elevated BP) 37.5 weeks 5#7  C/S 10/25/2015 - arrest of dilation, chorio 40 weeks 6#10   4 large fibroids (~8-12cm as per patient)  denies hx of ovarian cysts, abnormal pap smear, STDs  Surg Hx: C/S x 2 2015  Allergies: NKDA

## 2022-12-24 NOTE — OB POSTPARTUM TRIAGE NOTE - NS_OBGYNHISTORY_OBGYN_ALL_OB_FT
Repeat C/S 12/5/2022 - [facility] d/t large fibroids with adverse reaction to blood transfusion (shaking, fever, elevated BP) 37.5 weeks 5#7  C/S 10/25/2015 - arrest of dilation, chorio 40 weeks 6#10   4 large fibroids (~8-12cm as per patient)

## 2022-12-24 NOTE — OB POSTPARTUM TRIAGE NOTE - NS_ATTENDINFORMED_OBGYN_ALL_OB
Impression: Age-related nuclear cataract, bilateral: H25.13. Plan: RTC in 3 months for complete exam.  May be ready for cat surgery / MIGS at that time. Devorah Cross MD

## 2022-12-24 NOTE — H&P ADULT - HISTORY OF PRESENT ILLNESS
31 y/o  with CHTN s/p repeat C/S on  presents today for elevated blood pressure at home of 190/120, dull epigastric pain (4/10 in severity) and lightheadedness. Patient was admitted at [facility] for severe range blood pressures and headache on  and was discharged today. Patient was given magnesium for 24 hours from  4am to  4am. Patient takes Labetalol 300mg TID (last dose taken at 2pm) and procardia 60mg QD (taken at 6am). Currently denies any headache, visual disturbances, lower extremity edema.  AP complicated by PPH during C/S d/t large uterine fibroids with a reaction to blood transfusion. Patient states she began to shake, had a fever and elevated blood pressures with the blood transfusion which was then discontinued. Patient was also admitted twice during this pregnancy for  uterine contractions s/p Indocin x 2 days at 23 weeks and 30 weeks.     Med Hx: CHTN, hypothyroidism, thalassemia minor, migraine, anemia  Meds:  Labetalol 300mg TID, Procardia 60mg QD, levothyroxine 50 mcg QD, rizatriptan, iron, vitamin C   OBGYN Hx: Repeat C/S 2022 - [facility] d/t large fibroids with adverse reaction to blood transfusion (shaking, fever, elevated BP) 37.5 weeks 5#7  C/S 10/25/2015 - arrest of dilation, chorio 40 weeks 6#10   4 large fibroids (~8-12cm as per patient)  denies hx of ovarian cysts, abnormal pap smear, STDs  Surg Hx: C/S x 2 2015  Allergies: NKDA 31 y/o  with CHTN s/p repeat C/S on  presents today for elevated blood pressure at home of 190/120, dull epigastric pain (4/10 in severity) and lightheadedness. Patient was admitted at [facility] for severe range blood pressures and headache on  and was discharged today. Patient was given magnesium for 24 hours from  4am to  4am. Patient currently takes Labetalol 300mg TID (last dose taken at 2pm) and procardia 60mg QD (taken at 6am). Currently denies any headache, visual disturbances, lower extremity edema.  AP complicated by PPH during C/S d/t large uterine fibroids with a reaction to blood transfusion. Patient states she began to shake, had a fever and elevated blood pressures with the blood transfusion which was then discontinued. Patient was also admitted twice during this pregnancy for  uterine contractions s/p Indocin x 2 days at 23 weeks and 30 weeks.     Med Hx: CHTN, hypothyroidism, thalassemia minor, migraine, anemia  Meds:  Labetalol 300mg TID, Procardia 60mg QD, levothyroxine 50 mcg QD, rizatriptan, iron, vitamin C   OBGYN Hx: Repeat C/S 2022 - [facility] d/t large fibroids with adverse reaction to blood transfusion (shaking, fever, elevated BP) 37.5 weeks 5#7  C/S 10/25/2015 - arrest of dilation, chorio 40 weeks 6#10   4 large fibroids (~8-12cm as per patient)  denies hx of ovarian cysts, abnormal pap smear, STDs  Surg Hx: C/S x 2 2015  Allergies: NKDA

## 2022-12-24 NOTE — H&P ADULT - ASSESSMENT
31 y/o  s/p repeat C/S  evaluated for severe range blood pressures (165/101, 184/102)  - Procardia 10mg IR given  - HELLP labs sent    - Admit to antepartum for superimposed preeclampsia with severe range blood pressures  - Repeat blood pressure 170/98 after procardia 10mg IR, procardia 20mg IR given  - Patient was on magnesium for 24 hours (-) at [facility], no magnesium needed at this time  - Will continue to monitor BP and wait for lab results  - Admission labs done  - Blood transfusion consent form signed  - Covid swab done  - Case discussed with Dr. Cross, Dr. Moser, and Dr. Cindy Reece PGY-4 31 y/o  s/p repeat C/S  evaluated for severe range blood pressures (165/101, 184/102)  - Procardia 10mg IR given  - HELLP labs sent    - Admit to antepartum for superimposed preeclampsia with severe range blood pressures  - Repeat blood pressure 170/98 after procardia 10mg IR, procardia 20mg IR given  - Patient was on magnesium for 24 hours (-) at [facility], no magnesium needed at this time  - Will continue to monitor BP and wait for lab results  -  Maintenance dose of Procardia increased to 90 mg XR QD (increased from 60mg XR QD)  - Continue with Labetalol 300mg TID  - Continue with levothyroxine  - Admission labs done  - Blood transfusion consent form signed  - Covid swab done  - Case discussed with Dr. Cross, Dr. Moser, and Dr. Cindy Reece PGY-4

## 2022-12-24 NOTE — OB POSTPARTUM TRIAGE NOTE - NSOBPROVIDERNOTE_OBGYN_ALL_OB_FT
31 y/o  s/p repeat C/S   - procardia 10 IR given 31 y/o  s/p repeat C/S   - procardia 10mg IR given  - HELLP labs sent  - Admit to antepartum for blood pressure monitoring  - repeat blood pressure 170/98 after procardia 10mg IR, procardia 20mg IR given  - Admission labs done  - Consent forms **  - Covid swab **  - Case discussed with Dr. Cross, Dr. Moser, and Dr. Cindy Reece PGY-4 33 y/o  s/p repeat C/S  evaluated for severe range blood pressures (165/101, 184/102)  - Procardia 10mg IR given  - HELLP labs sent  - Admit to antepartum for superimposed preeclampsia  - Repeat blood pressure 170/98 after procardia 10mg IR, procardia 20mg IR given  - Patient was on magnesium for 24 hours (-), no magnesium needed at this time  - Will continue to monitor BP and wait for lab results  - Admission labs done  - Consent forms **  - Covid swab done  - Case discussed with Dr. Cross, Dr. Moser, and Dr. Cindy Reece PGY-4 33 y/o  s/p repeat C/S  evaluated for severe range blood pressures (165/101, 184/102)  - Procardia 10mg IR given  - HELLP labs sent  - Admit to antepartum for superimposed preeclampsia  - Repeat blood pressure 170/98 after procardia 10mg IR, procardia 20mg IR given  - Patient was on magnesium for 24 hours (-), no magnesium needed at this time  - Will continue to monitor BP and wait for lab results  - Admission labs done  - Consent form **  - Covid swab done  - Case discussed with Dr. Cross, Dr. Moser, and Dr. Cindy Reece PGY-4 33 y/o  s/p repeat C/S  evaluated for severe range blood pressures (165/101, 184/102)  - Procardia 10mg IR given  - HELLP labs sent  - Admit to antepartum for superimposed preeclampsia  - Repeat blood pressure 170/98 after procardia 10mg IR, procardia 20mg IR given  - Patient was on magnesium for 24 hours (-) at [facility], no magnesium needed at this time  - Will continue to monitor BP and wait for lab results  - Admission labs done  - Blood transfusion consent form signed  - Covid swab done  - Case discussed with Dr. Cross, Dr. Moser, and Dr. Cindy Reece PGY-4 33 y/o  s/p repeat C/S  evaluated for severe range blood pressures (165/101, 184/102)  - Procardia 10mg IR given  - HELLP labs sent    - Admit to antepartum for superimposed preeclampsia with severe range blood pressures  - Repeat blood pressure 170/98 after procardia 10mg IR, procardia 20mg IR given  - Patient was on magnesium for 24 hours (-) at [facility], no magnesium needed at this time  - Will continue to monitor BP and wait for lab results  - Maintenance dose of Procardia increased to 90 mg XR QD (increased from 60mg XR QD)  - Continue with Labetalol 300mg TID  - Continue with levothyroxine  - Admission labs done  - Blood transfusion consent form signed  - Covid swab done  - Case discussed with Dr. Cross, Dr. Moser, and Dr. Cindy Reece PGY-4

## 2022-12-24 NOTE — OB POSTPARTUM TRIAGE NOTE - NSHPPHYSICALEXAM_GEN_ALL_CORE
12/24/22  BP in triage: 142//102  HR: 56-67    A&O x 3  Lungs: CTAB  Abd: soft nontender to palpation

## 2022-12-24 NOTE — PATIENT PROFILE ADULT - NSPROGENOTHERPROVIDER_GEN_A_NUR
F/u with [facility] in 2 weeks  Keep incision covered x 4 days  Dressing changes PRN   OK to shower when drain is removed  Continue muscle relaxer and oral pain control   Ice incision frequently  Encourage daily stool softener    Discharge Instructions    Discharged to home by car with relative. Discharged via wheelchair, hospital escort: Yes.  Special equipment needed: Not Applicable    Be sure to schedule a follow-up appointment with your primary care doctor or any specialists as instructed.     Discharge Plan:   Diet Plan: Discussed  Activity Level: Discussed  Confirmed Follow up Appointment: Patient to Call and Schedule Appointment  Confirmed Symptoms Management: Discussed  Medication Reconciliation Updated: Yes    I understand that a diet low in cholesterol, fat, and sodium is recommended for good health. Unless I have been given specific instructions below for another diet, I accept this instruction as my diet prescription.   Other diet: regular    Special Instructions: None    · Is patient discharged on Warfarin / Coumadin?   No     Depression / Suicide Risk    As you are discharged from this [facility] facility, it is important to learn how to keep safe from harming yourself.    Recognize the warning signs:  · Abrupt changes in personality, positive or negative- including increase in energy   · Giving away possessions  · Change in eating patterns- significant weight changes-  positive or negative  · Change in sleeping patterns- unable to sleep or sleeping all the time   · Unwillingness or inability to communicate  · Depression  · Unusual sadness, discouragement and loneliness  · Talk of wanting to die  · Neglect of personal appearance   · Rebelliousness- reckless behavior  · Withdrawal from people/activities they love  · Confusion- inability to concentrate     If you or a loved one observes any of these behaviors or has concerns about self-harm, here's what you can do:  · Talk about it- your feelings  and reasons for harming yourself  · Remove any means that you might use to hurt yourself (examples: pills, rope, extension cords, firearm)  · Get professional help from the community (Mental Health, Substance Abuse, psychological counseling)  · Do not be alone:Call your Safe Contact- someone whom you trust who will be there for you.  · Call your local CRISIS HOTLINE 570-3226 or 832-281-7375  · Call your local Children's Mobile Crisis Response Team [place] (531) 195-5304 or www.CellPhire  · Call the toll free National Suicide Prevention Hotlines   · National Suicide Prevention Lifeline 360-841-MBHO (8313)  · National Hope Line Network 800-SUICIDE (287-7842)       none

## 2022-12-24 NOTE — ED ADULT TRIAGE NOTE - CHIEF COMPLAINT QUOTE
Patient had a Csection on 12/5 . MD is Dr. Cross. She went to [place] [id number] 12/21 for headache and was told she had preeclampsia. Pt was released today and states she still has elevated blood pressure.

## 2022-12-25 LAB
ALBUMIN SERPL ELPH-MCNC: 4.1 G/DL — SIGNIFICANT CHANGE UP (ref 3.3–5)
ALP SERPL-CCNC: 62 U/L — SIGNIFICANT CHANGE UP (ref 40–120)
ALT FLD-CCNC: 10 U/L — SIGNIFICANT CHANGE UP (ref 4–33)
ANION GAP SERPL CALC-SCNC: 11 MMOL/L — SIGNIFICANT CHANGE UP (ref 7–14)
APTT BLD: 31 SEC — SIGNIFICANT CHANGE UP (ref 27–36.3)
AST SERPL-CCNC: 14 U/L — SIGNIFICANT CHANGE UP (ref 4–32)
BASOPHILS # BLD AUTO: 0.04 K/UL — SIGNIFICANT CHANGE UP (ref 0–0.2)
BASOPHILS NFR BLD AUTO: 0.8 % — SIGNIFICANT CHANGE UP (ref 0–2)
BILIRUB SERPL-MCNC: 0.5 MG/DL — SIGNIFICANT CHANGE UP (ref 0.2–1.2)
BLD GP AB SCN SERPL QL: POSITIVE — SIGNIFICANT CHANGE UP
BUN SERPL-MCNC: 6 MG/DL — LOW (ref 7–23)
CALCIUM SERPL-MCNC: 9.1 MG/DL — SIGNIFICANT CHANGE UP (ref 8.4–10.5)
CHLORIDE SERPL-SCNC: 105 MMOL/L — SIGNIFICANT CHANGE UP (ref 98–107)
CO2 SERPL-SCNC: 27 MMOL/L — SIGNIFICANT CHANGE UP (ref 22–31)
COVID-19 SPIKE DOMAIN AB INTERP: POSITIVE
COVID-19 SPIKE DOMAIN ANTIBODY RESULT: >250 U/ML — HIGH
CREAT SERPL-MCNC: 0.47 MG/DL — LOW (ref 0.5–1.3)
EGFR: 128 ML/MIN/1.73M2 — SIGNIFICANT CHANGE UP
EOSINOPHIL # BLD AUTO: 0.18 K/UL — SIGNIFICANT CHANGE UP (ref 0–0.5)
EOSINOPHIL NFR BLD AUTO: 3.6 % — SIGNIFICANT CHANGE UP (ref 0–6)
GLUCOSE SERPL-MCNC: 96 MG/DL — SIGNIFICANT CHANGE UP (ref 70–99)
HCT VFR BLD CALC: 31.5 % — LOW (ref 34.5–45)
HGB BLD-MCNC: 9.3 G/DL — LOW (ref 11.5–15.5)
IANC: 2.86 K/UL — SIGNIFICANT CHANGE UP (ref 1.8–7.4)
IMM GRANULOCYTES NFR BLD AUTO: 0.2 % — SIGNIFICANT CHANGE UP (ref 0–0.9)
INR BLD: 1.09 RATIO — SIGNIFICANT CHANGE UP (ref 0.88–1.16)
LDH SERPL L TO P-CCNC: 161 U/L — SIGNIFICANT CHANGE UP (ref 135–225)
LYMPHOCYTES # BLD AUTO: 1.5 K/UL — SIGNIFICANT CHANGE UP (ref 1–3.3)
LYMPHOCYTES # BLD AUTO: 29.7 % — SIGNIFICANT CHANGE UP (ref 13–44)
MCHC RBC-ENTMCNC: 20.2 PG — LOW (ref 27–34)
MCHC RBC-ENTMCNC: 29.5 GM/DL — LOW (ref 32–36)
MCV RBC AUTO: 68.3 FL — LOW (ref 80–100)
MONOCYTES # BLD AUTO: 0.46 K/UL — SIGNIFICANT CHANGE UP (ref 0–0.9)
MONOCYTES NFR BLD AUTO: 9.1 % — SIGNIFICANT CHANGE UP (ref 2–14)
NEUTROPHILS # BLD AUTO: 2.86 K/UL — SIGNIFICANT CHANGE UP (ref 1.8–7.4)
NEUTROPHILS NFR BLD AUTO: 56.6 % — SIGNIFICANT CHANGE UP (ref 43–77)
NRBC # BLD: 0 /100 WBCS — SIGNIFICANT CHANGE UP (ref 0–0)
NRBC # FLD: 0 K/UL — SIGNIFICANT CHANGE UP (ref 0–0)
PLATELET # BLD AUTO: 618 K/UL — HIGH (ref 150–400)
POTASSIUM SERPL-MCNC: 3.5 MMOL/L — SIGNIFICANT CHANGE UP (ref 3.5–5.3)
POTASSIUM SERPL-SCNC: 3.5 MMOL/L — SIGNIFICANT CHANGE UP (ref 3.5–5.3)
PROT SERPL-MCNC: 7.6 G/DL — SIGNIFICANT CHANGE UP (ref 6–8.3)
PROTHROM AB SERPL-ACNC: 12.7 SEC — SIGNIFICANT CHANGE UP (ref 10.5–13.4)
RBC # BLD: 4.61 M/UL — SIGNIFICANT CHANGE UP (ref 3.8–5.2)
RBC # FLD: 16.6 % — HIGH (ref 10.3–14.5)
RH IG SCN BLD-IMP: POSITIVE — SIGNIFICANT CHANGE UP
SARS-COV-2 IGG+IGM SERPL QL IA: >250 U/ML — HIGH
SARS-COV-2 IGG+IGM SERPL QL IA: POSITIVE
SARS-COV-2 RNA SPEC QL NAA+PROBE: SIGNIFICANT CHANGE UP
SODIUM SERPL-SCNC: 143 MMOL/L — SIGNIFICANT CHANGE UP (ref 135–145)
URATE SERPL-MCNC: 3.6 MG/DL — SIGNIFICANT CHANGE UP (ref 2.5–7)
WBC # BLD: 5.05 K/UL — SIGNIFICANT CHANGE UP (ref 3.8–10.5)
WBC # FLD AUTO: 5.05 K/UL — SIGNIFICANT CHANGE UP (ref 3.8–10.5)

## 2022-12-25 RX ORDER — SIMETHICONE 80 MG/1
80 TABLET, CHEWABLE ORAL EVERY 4 HOURS
Refills: 0 | Status: DISCONTINUED | OUTPATIENT
Start: 2022-12-25 | End: 2022-12-26

## 2022-12-25 RX ORDER — POTASSIUM CHLORIDE 20 MEQ
20 PACKET (EA) ORAL
Refills: 0 | Status: DISCONTINUED | OUTPATIENT
Start: 2022-12-25 | End: 2022-12-26

## 2022-12-25 RX ORDER — POTASSIUM CHLORIDE 20 MEQ
20 PACKET (EA) ORAL ONCE
Refills: 0 | Status: COMPLETED | OUTPATIENT
Start: 2022-12-25 | End: 2022-12-25

## 2022-12-25 RX ORDER — NIFEDIPINE 30 MG
90 TABLET, EXTENDED RELEASE 24 HR ORAL DAILY
Refills: 0 | Status: DISCONTINUED | OUTPATIENT
Start: 2022-12-25 | End: 2022-12-26

## 2022-12-25 RX ADMIN — Medication 20 MILLIEQUIVALENT(S): at 06:13

## 2022-12-25 RX ADMIN — Medication 20 MILLIEQUIVALENT(S): at 10:22

## 2022-12-25 RX ADMIN — Medication 650 MILLIGRAM(S): at 20:50

## 2022-12-25 RX ADMIN — Medication 650 MILLIGRAM(S): at 14:45

## 2022-12-25 RX ADMIN — Medication 50 MICROGRAM(S): at 06:13

## 2022-12-25 RX ADMIN — SIMETHICONE 80 MILLIGRAM(S): 80 TABLET, CHEWABLE ORAL at 17:22

## 2022-12-25 RX ADMIN — Medication 20 MILLIEQUIVALENT(S): at 08:07

## 2022-12-25 RX ADMIN — Medication 90 MILLIGRAM(S): at 08:07

## 2022-12-25 RX ADMIN — Medication 650 MILLIGRAM(S): at 21:20

## 2022-12-25 RX ADMIN — Medication 40 MILLIEQUIVALENT(S): at 02:51

## 2022-12-25 RX ADMIN — Medication 300 MILLIGRAM(S): at 06:14

## 2022-12-25 RX ADMIN — Medication 650 MILLIGRAM(S): at 13:45

## 2022-12-25 RX ADMIN — Medication 300 MILLIGRAM(S): at 13:45

## 2022-12-26 ENCOUNTER — TRANSCRIPTION ENCOUNTER (OUTPATIENT)
Age: 32
End: 2022-12-26

## 2022-12-26 VITALS
TEMPERATURE: 98 F | OXYGEN SATURATION: 100 % | HEART RATE: 76 BPM | RESPIRATION RATE: 17 BRPM | DIASTOLIC BLOOD PRESSURE: 82 MMHG | SYSTOLIC BLOOD PRESSURE: 120 MMHG

## 2022-12-26 RX ORDER — NIFEDIPINE 30 MG
1 TABLET, EXTENDED RELEASE 24 HR ORAL
Qty: 30 | Refills: 0
Start: 2022-12-26 | End: 2023-01-24

## 2022-12-26 RX ORDER — NIFEDIPINE 30 MG
1 TABLET, EXTENDED RELEASE 24 HR ORAL
Qty: 0 | Refills: 0 | DISCHARGE

## 2022-12-26 RX ADMIN — Medication 300 MILLIGRAM(S): at 01:16

## 2022-12-26 RX ADMIN — Medication 50 MICROGRAM(S): at 06:19

## 2022-12-26 RX ADMIN — Medication 300 MILLIGRAM(S): at 09:32

## 2022-12-26 RX ADMIN — Medication 90 MILLIGRAM(S): at 08:10

## 2022-12-26 NOTE — DISCHARGE NOTE PROVIDER - NSDCMRMEDTOKEN_GEN_ALL_CORE_FT
labetalol 300 mg oral tablet: 300 milligram(s) orally 3 times a day  levothyroxine 50 mcg (0.05 mg) oral tablet: 1 tab(s) orally once a day  Prenatal 1 oral capsule:   Procardia XL 60 mg oral tablet, extended release: 1 tab(s) orally once a day   labetalol 300 mg oral tablet: 300 milligram(s) orally 3 times a day  levothyroxine 50 mcg (0.05 mg) oral tablet: 1 tab(s) orally once a day  NIFEdipine 90 mg oral tablet, extended release: 1 tab(s) orally once a day  Prenatal 1 oral capsule: orally once a day

## 2022-12-26 NOTE — DISCHARGE NOTE NURSING/CASE MANAGEMENT/SOCIAL WORK - NSDCPEFALRISK_GEN_ALL_CORE
For information on Fall & Injury Prevention, visit: [email or web address] OR  [email or web address] OR  https://www.cdc.gov/steadi/patient.html

## 2022-12-26 NOTE — DISCHARGE NOTE PROVIDER - HOSPITAL COURSE
33yo readmitted on POD#19 s/p rLTCS c/b PPH with transfusion for management of siPEC w/SF. Patient s/p Proc 10/20IR on admission and Mg for seizure PPx (12/22-12/23). BPs now controlled with Procardia 90xl + Labetalol 300 q8h for standing antihypertensives (increased from home dose on procardia 60xl and labetalol 330 q8h). Pt stable BPs within normal limits. HELLP labs WNL. Patient denies headaches, visual changes, RUQ pain. She is stable for discharge home with close outpatient OB follow up and Cardio OB follow up.

## 2022-12-26 NOTE — DISCHARGE NOTE NURSING/CASE MANAGEMENT/SOCIAL WORK - PATIENT PORTAL LINK FT
You can access the FollowMyHealth Patient Portal offered by [facility] by registering at the following website: [email or web address]. By joining [employer]’s FollowMyHealth portal, you will also be able to view your health information using other applications (apps) compatible with our system.

## 2022-12-26 NOTE — PROGRESS NOTE ADULT - ASSESSMENT
33yo readmitted on POD#19 s/p rLTCS c/b PPH with transfusion for management of siPEC w/SF. Patient s/p Proc 10/20IR and Procardia 90xl + Labetalol 300 q8h for standing antihypertensives. Pt stable overnight, BPs within normal limits overnight.     #sPEC  - /p Mg for seizure PPx (12/22-12/23)  - continue Procardia 90XL Labetalol 300 q8h  - monitor BPs  - HELLP labs wnl    #Postpartum state  - Regular diet  - OOB, encourage ambulation  - Continue motrin, tylenol for pain control.      [place] PGY3
A/P: 33yo readmitted on POD#19 from rLTCS c/b PPH with transfusion and siPEC w/ SF for management of siPEC w/SF s/p Proc 10/20IR, now HD2. Pt stable overnight.    #sPEC  - Mg for seizure PPx (12/22-12/23)  - continue Procardia 90XL Labetalol 300 q8h  - monitor BPs  - HELLP labs wnl    #Postpartum state  - Regular diet  - OOB, encourage ambulation  - Continue motrin, tylenol for pain control.      Xiao Lay, PGY-3

## 2022-12-26 NOTE — DISCHARGE NOTE PROVIDER - NSDCCPCAREPLAN_GEN_ALL_CORE_FT
PRINCIPAL DISCHARGE DIAGNOSIS  Diagnosis: Preeclampsia in postpartum period  Assessment and Plan of Treatment: - Continue BP meds as prescribed (hold is BP is under 110/60 or HR is under 60)  -Take blood pressure with at home cuff prior to taking medications; if BP is >150/90 call MD  - Return to hospital with headaches, visual changes, abdominal pain, nausea, vomiting, chest pain or shortness of breathe  - Follow up with OB in 2-4 days for BP check  - Follow up with [facility] 12/29 with Dr Walden as scheduled

## 2022-12-26 NOTE — DISCHARGE NOTE PROVIDER - NSDCFUSCHEDAPPT_GEN_ALL_CORE_FT
Noni Walden Physician [facility]  CARDIOLOGY [phone]  Scheduled Appointment: 12/29/2022

## 2022-12-26 NOTE — DISCHARGE NOTE OB - PATIENT PORTAL LINK FT
You can access the FollowMyHealth Patient Portal offered by [facility] by registering at the following website: [email or web address]. By joining [employer]’s FollowMyHealth portal, you will also be able to view your health information using other applications (apps) compatible with our system.

## 2022-12-26 NOTE — PROGRESS NOTE ADULT - SUBJECTIVE AND OBJECTIVE BOX
OB Progress Note    S: Patient examined at bedside. Denies any complaints. Denies N/V, CP/SOB/lightheadedness/dizziness, headache, visual disturbances, epigastric pain.     O:   Vital Signs Last 24 Hrs  T(C): 36.7 (25 Dec 2022 01:01), Max: 36.7 (24 Dec 2022 19:28)  T(F): 98.1 (25 Dec 2022 01:01), Max: 98.1 (25 Dec 2022 01:01)  HR: 79 (25 Dec 2022 01:01) (66 - 88)  BP: 106/76 (25 Dec 2022 01:01) (98/64 - 164/94)  BP(mean): --  RR: 18 (25 Dec 2022 01:01) (18 - 18)  SpO2: 100% (25 Dec 2022 01:01) (99% - 100%)    Parameters below as of 25 Dec 2022 01:01  Patient On (Oxygen Delivery Method): room air        Labs:  Blood type: B Positive  Rubella IgG: RPR: Negative                          8.5<L>   6.10 >-----------< 620<H>    ( 12-24 @ 20:40 )             28.5<L>    12-24-22 @ 20:40      141  |  102  |  7   ----------------------------<  103<H>  3.0<L>   |  27  |  0.49<L>        Ca    9.5      24 Dec 2022 20:40    TPro  7.4  /  Alb  4.2  /  TBili  0.5  /  DBili  x   /  AST  15  /  ALT  13  /  AlkPhos  62  12-24-22 @ 20:40          PE:  General: NAD  Abdomen: non tender nondistended,  Fundus firm  Extremities: No erythema, no pitting edema    
Patient seen and examined at bedside, no acute overnight events. No acute complaints, pain well controlled. Patient is ambulating, voiding spontaneously, passing flatus, and tolerating regular diet. Denies CP, SOB, N/V, HA, blurred vision, epigastric pain.    Vital Signs Last 24 Hours  T(C): 36.7 (12-26-22 @ 01:09), Max: 37.3 (12-25-22 @ 17:27)  HR: 71 (12-26-22 @ 01:09) (71 - 80)  BP: 114/80 (12-26-22 @ 01:09) (103/71 - 130/76)  RR: 18 (12-26-22 @ 01:09) (18 - 18)  SpO2: 100% (12-26-22 @ 01:09) (99% - 100%)    Physical Exam:  General: NAD  Abdomen: Soft, non-tender, non-distended, fundus firm  Incision: Pfannenstiel incision CDI, subcuticular suture closure  Pelvic: Lochia wnl    Labs:    Blood Type: B Positive  Antibody Screen: Positive  RPR: Negative               9.3    5.05  )-----------( 618      ( 12-25 @ 06:28 )             31.5                8.5    6.10  )-----------( 620      ( 12-24 @ 20:40 )             28.5                7.3    15.79 )-----------( 318      ( 12-07 @ 06:05 )             23.1         MEDICATIONS  (STANDING):  heparin   Injectable 5000 Unit(s) SubCutaneous every 12 hours  labetalol 300 milliGRAM(s) Oral every 8 hours  levothyroxine 50 MICROGram(s) Oral daily  NIFEdipine XL 90 milliGRAM(s) Oral daily  potassium chloride    Tablet ER 20 milliEquivalent(s) Oral every 2 hours  prenatal multivitamin 1 Tablet(s) Oral daily    MEDICATIONS  (PRN):  acetaminophen     Tablet .. 650 milliGRAM(s) Oral every 6 hours PRN Mild Pain (1 - 3)  ibuprofen  Tablet. 600 milliGRAM(s) Oral every 6 hours PRN Moderate Pain (4 - 6), Severe Pain (7 - 10)  simethicone 80 milliGRAM(s) Chew every 4 hours PRN Gas

## 2022-12-26 NOTE — DISCHARGE NOTE PROVIDER - CARE PROVIDER_API CALL
Devorah Cross)  Obstetrics and Gynecology  [address], [address]  [place] [address]  Phone: [phone]  Fax: [phone]  Follow Up Time:

## 2022-12-26 NOTE — DISCHARGE NOTE PROVIDER - NSDCFUADDAPPT_GEN_ALL_CORE_FT
- Continue BP meds as prescribed (hold is BP is under 110/60 or HR is under 60)  -Take blood pressure with at home cuff prior to taking medications; if BP is >150/90 call MD  - Return to hospital with headaches, visual changes, abdominal pain, nausea, vomiting, chest pain or shortness of breathe  - Follow up with OB in 2-4 days for BP check  - Follow up with [facility] 12/29 with Dr Walden as scheduled

## 2022-12-27 ENCOUNTER — NON-APPOINTMENT (OUTPATIENT)
Age: 32
End: 2022-12-27

## 2022-12-29 ENCOUNTER — APPOINTMENT (OUTPATIENT)
Dept: CARDIOLOGY | Facility: CLINIC | Age: 32
End: 2022-12-29
Payer: MEDICAID

## 2022-12-29 ENCOUNTER — NON-APPOINTMENT (OUTPATIENT)
Age: 32
End: 2022-12-29

## 2022-12-29 VITALS
SYSTOLIC BLOOD PRESSURE: 89 MMHG | HEART RATE: 65 BPM | BODY MASS INDEX: 30.86 KG/M2 | DIASTOLIC BLOOD PRESSURE: 72 MMHG | WEIGHT: 147 LBS | OXYGEN SATURATION: 100 % | HEIGHT: 58 IN

## 2022-12-29 PROCEDURE — 93000 ELECTROCARDIOGRAM COMPLETE: CPT

## 2022-12-29 PROCEDURE — 99213 OFFICE O/P EST LOW 20 MIN: CPT | Mod: 25

## 2022-12-29 NOTE — DISCUSSION/SUMMARY
[FreeTextEntry1] : 32 year old woman  who delivered 12.5 and was readmitted for PPEC -\par Will decrease Labetalol to 200 mg TID\par Continue Procardia 90 mg daily\par FU in 6 weeks [EKG obtained to assist in diagnosis and management of assessed problem(s)] : EKG obtained to assist in diagnosis and management of assessed problem(s)

## 2022-12-29 NOTE — HISTORY OF PRESENT ILLNESS
[FreeTextEntry1] : 32 year old woman  who delivered 12.5.22 at 37.5 weeks.History of chronic hypertension.\par Currently on Labetalol 300 mg TID, Procardia 90 mg daily\par \par She was readmitted to hospital -\par She carries history chronic HTN in  and was previously on HCTZ\par \par Saw Dr. Bailon and had echo performed\par \par \par \par \par

## 2023-01-03 ENCOUNTER — NON-APPOINTMENT (OUTPATIENT)
Age: 33
End: 2023-01-03

## 2023-01-16 ENCOUNTER — TRANSCRIPTION ENCOUNTER (OUTPATIENT)
Age: 33
End: 2023-01-16

## 2023-02-06 ENCOUNTER — APPOINTMENT (OUTPATIENT)
Dept: CARDIOLOGY | Facility: CLINIC | Age: 33
End: 2023-02-06
Payer: MEDICAID

## 2023-02-06 ENCOUNTER — NON-APPOINTMENT (OUTPATIENT)
Age: 33
End: 2023-02-06

## 2023-02-06 VITALS
HEART RATE: 50 BPM | HEIGHT: 58 IN | BODY MASS INDEX: 30.44 KG/M2 | SYSTOLIC BLOOD PRESSURE: 141 MMHG | OXYGEN SATURATION: 100 % | WEIGHT: 145 LBS | DIASTOLIC BLOOD PRESSURE: 89 MMHG

## 2023-02-06 PROCEDURE — 93000 ELECTROCARDIOGRAM COMPLETE: CPT

## 2023-02-06 PROCEDURE — 99213 OFFICE O/P EST LOW 20 MIN: CPT | Mod: 25

## 2023-02-06 NOTE — DISCUSSION/SUMMARY
[EKG obtained to assist in diagnosis and management of assessed problem(s)] : EKG obtained to assist in diagnosis and management of assessed problem(s) [FreeTextEntry1] : 32 year old woman  who delivered 12.5 and was readmitted for PPEC -\par Will continue Labetalol to 200 mg TID\par Continue Procardia 90 mg daily\par FU in 6 weeks

## 2023-02-06 NOTE — HISTORY OF PRESENT ILLNESS
[FreeTextEntry1] : 32 year old woman  who delivered 12.5.22 at 37.5 weeks.History of chronic hypertension.\par Currently on Labetalol 300 mg TID, Procardia 90 mg daily\par \par She was readmitted to hospital -\par She carries history chronic HTN in  and was previously on HCTZ\par \par BP elevated in afternoon\par \par \par \par \par

## 2023-02-21 ENCOUNTER — NON-APPOINTMENT (OUTPATIENT)
Age: 33
End: 2023-02-21

## 2023-02-21 ENCOUNTER — RX CHANGE (OUTPATIENT)
Age: 33
End: 2023-02-21

## 2023-02-21 RX ORDER — NIFEDIPINE 30 MG/1
30 TABLET, EXTENDED RELEASE ORAL
Qty: 180 | Refills: 3 | Status: DISCONTINUED | COMMUNITY
Start: 2023-02-21 | End: 2023-02-21

## 2023-02-21 RX ORDER — LABETALOL HYDROCHLORIDE 200 MG/1
200 TABLET, FILM COATED ORAL EVERY 8 HOURS
Qty: 270 | Refills: 3 | Status: DISCONTINUED | COMMUNITY
End: 2023-02-21

## 2023-03-08 ENCOUNTER — APPOINTMENT (OUTPATIENT)
Dept: CV DIAGNOSITCS | Facility: HOSPITAL | Age: 33
End: 2023-03-08

## 2023-03-08 ENCOUNTER — OUTPATIENT (OUTPATIENT)
Dept: OUTPATIENT SERVICES | Facility: HOSPITAL | Age: 33
LOS: 1 days | End: 2023-03-08
Payer: MEDICAID

## 2023-03-08 DIAGNOSIS — Z98.891 HISTORY OF UTERINE SCAR FROM PREVIOUS SURGERY: Chronic | ICD-10-CM

## 2023-03-08 DIAGNOSIS — I10 ESSENTIAL (PRIMARY) HYPERTENSION: ICD-10-CM

## 2023-03-08 PROCEDURE — 93306 TTE W/DOPPLER COMPLETE: CPT | Mod: 26

## 2023-03-15 NOTE — OB PST NOTE - NS_LMP_OBGYN_ALL_OB_DT
Future Appointments    Encounter Information    Provider Department Appt Notes   4/7/2023 Ashly Solis MD [facility] Internal Medicine 5 months for labs and med check up     Past Visits    Date Provider Specialty Visit Type Primary Dx   03/01/2023 Ashly Solis MD Internal Medicine Office Visit Enteritis 17-Mar-2022

## 2023-04-17 ENCOUNTER — APPOINTMENT (OUTPATIENT)
Dept: CARDIOLOGY | Facility: CLINIC | Age: 33
End: 2023-04-17
Payer: MEDICAID

## 2023-04-17 ENCOUNTER — NON-APPOINTMENT (OUTPATIENT)
Age: 33
End: 2023-04-17

## 2023-04-17 VITALS
OXYGEN SATURATION: 100 % | HEIGHT: 58 IN | WEIGHT: 145 LBS | HEART RATE: 56 BPM | DIASTOLIC BLOOD PRESSURE: 86 MMHG | SYSTOLIC BLOOD PRESSURE: 126 MMHG | BODY MASS INDEX: 30.44 KG/M2

## 2023-04-17 PROCEDURE — 93000 ELECTROCARDIOGRAM COMPLETE: CPT

## 2023-04-17 PROCEDURE — 99214 OFFICE O/P EST MOD 30 MIN: CPT | Mod: 25

## 2023-04-17 RX ORDER — CARVEDILOL 3.12 MG/1
3.12 TABLET, FILM COATED ORAL TWICE DAILY
Qty: 180 | Refills: 3 | Status: DISCONTINUED | COMMUNITY
Start: 2023-02-21 | End: 2023-04-17

## 2023-04-17 NOTE — DISCUSSION/SUMMARY
[FreeTextEntry1] : 32 year old woman  who delivered 12.5 and was readmitted for PPEC -\par Will continue Nifedipine 30 mg BID\par TTE normal\par FU in 4 months [EKG obtained to assist in diagnosis and management of assessed problem(s)] : EKG obtained to assist in diagnosis and management of assessed problem(s)

## 2023-04-17 NOTE — HISTORY OF PRESENT ILLNESS
[FreeTextEntry1] : 32 year old woman  who delivered 12.5.22 at 37.5 weeks.History of chronic hypertension.\par \par Currently on Nifedipine 30 mg BID\par BP at goal\par \par \par \par \par

## 2023-08-14 ENCOUNTER — APPOINTMENT (OUTPATIENT)
Dept: CARDIOLOGY | Facility: CLINIC | Age: 33
End: 2023-08-14

## 2023-11-06 ENCOUNTER — APPOINTMENT (OUTPATIENT)
Dept: CARDIOLOGY | Facility: CLINIC | Age: 33
End: 2023-11-06
Payer: MEDICAID

## 2023-11-06 ENCOUNTER — NON-APPOINTMENT (OUTPATIENT)
Age: 33
End: 2023-11-06

## 2023-11-06 VITALS
DIASTOLIC BLOOD PRESSURE: 82 MMHG | BODY MASS INDEX: 31.7 KG/M2 | HEIGHT: 58 IN | OXYGEN SATURATION: 100 % | WEIGHT: 151 LBS | HEART RATE: 63 BPM | SYSTOLIC BLOOD PRESSURE: 128 MMHG

## 2023-11-06 VITALS — TEMPERATURE: 98.3 F

## 2023-11-06 PROCEDURE — 99214 OFFICE O/P EST MOD 30 MIN: CPT | Mod: 25

## 2023-11-06 PROCEDURE — 93000 ELECTROCARDIOGRAM COMPLETE: CPT

## 2024-05-06 ENCOUNTER — APPOINTMENT (OUTPATIENT)
Dept: CARDIOLOGY | Facility: CLINIC | Age: 34
End: 2024-05-06

## 2024-06-07 NOTE — OB RN DELIVERY SUMMARY - BABY A: APGAR 1 MIN REFLEX IRRITABILITY, DELIVERY
Airway    Performed by: Ramiro Melo MD  Authorized by: Ramiro Melo MD    Final Airway Type:  Endotracheal airway  Final Endotracheal Airway*:  HAILEY tube  ETT Size (mm)*:  5.0  Cuff*:  Regular  Technique Used for Successful ETT Placement:  Direct laryngoscopy  Devices/Methods Used in Placement*:  Mask  Intubation Procedure*:  Preoxygenation, ETCO2, Atraumatic, Dentition Unchanged and Pharynx Clear  Insertion Site:  Oral  Blade Type*:  MAC  Blade Size*:  1.5  Cuff Volume (mL):  2  Measured from*:  Nares  Placement Verified by: auscultation and capnometry    Attempts*:  1  Number of Other Approaches Attempted:  0   Patient Identified, Procedure confirmed, Emergency equipment available and Safety protocols followed  Location:  OR  Urgency:  Elective  Difficult Airway: No    Indications for Airway Management:  Anesthesia  Sedation Level:  Anesthetized  Mask Difficulty Assessment:  1 - vent by mask  Start Time: 6/7/2024 12:13 PM       (2) cough or sneeze

## 2024-06-24 ENCOUNTER — APPOINTMENT (OUTPATIENT)
Dept: CARDIOLOGY | Facility: CLINIC | Age: 34
End: 2024-06-24
Payer: MEDICAID

## 2024-06-24 ENCOUNTER — NON-APPOINTMENT (OUTPATIENT)
Age: 34
End: 2024-06-24

## 2024-06-24 VITALS
DIASTOLIC BLOOD PRESSURE: 82 MMHG | HEIGHT: 58 IN | WEIGHT: 147 LBS | SYSTOLIC BLOOD PRESSURE: 126 MMHG | BODY MASS INDEX: 30.86 KG/M2 | OXYGEN SATURATION: 88 % | HEART RATE: 61 BPM

## 2024-06-24 DIAGNOSIS — I10 ESSENTIAL (PRIMARY) HYPERTENSION: ICD-10-CM

## 2024-06-24 PROCEDURE — 93000 ELECTROCARDIOGRAM COMPLETE: CPT

## 2024-06-24 PROCEDURE — 99214 OFFICE O/P EST MOD 30 MIN: CPT | Mod: 25

## 2024-06-24 PROCEDURE — G2211 COMPLEX E/M VISIT ADD ON: CPT | Mod: NC

## 2024-06-24 RX ORDER — NIFEDIPINE 30 MG/1
30 TABLET, EXTENDED RELEASE ORAL
Qty: 180 | Refills: 3 | Status: ACTIVE | COMMUNITY
Start: 2023-04-17 | End: 1900-01-01

## 2024-12-20 ENCOUNTER — NON-APPOINTMENT (OUTPATIENT)
Age: 34
End: 2024-12-20

## 2024-12-20 ENCOUNTER — APPOINTMENT (OUTPATIENT)
Dept: CARDIOLOGY | Facility: CLINIC | Age: 34
End: 2024-12-20
Payer: COMMERCIAL

## 2024-12-20 VITALS
SYSTOLIC BLOOD PRESSURE: 125 MMHG | BODY MASS INDEX: 31.07 KG/M2 | TEMPERATURE: 98.4 F | OXYGEN SATURATION: 99 % | DIASTOLIC BLOOD PRESSURE: 83 MMHG | HEART RATE: 72 BPM | WEIGHT: 148 LBS | HEIGHT: 58 IN

## 2024-12-20 DIAGNOSIS — I10 ESSENTIAL (PRIMARY) HYPERTENSION: ICD-10-CM

## 2024-12-20 PROCEDURE — 93000 ELECTROCARDIOGRAM COMPLETE: CPT

## 2024-12-20 PROCEDURE — 99214 OFFICE O/P EST MOD 30 MIN: CPT | Mod: 25

## 2025-01-28 NOTE — PROVIDER CONTACT NOTE (OTHER) - ACTION/TREATMENT ORDERED:
Labs all look good!     Blood counts were all normal.  Electrolytes, kidney function, liver function, thyroid function are all okay.    Cholesterol levels look fine.  Triglycerides appear to be elevated, but that is because we were not fasting.  IV LR bolus 1000ml,blood work sent , straight cath urine c/s sent .EKG done.

## 2025-03-05 NOTE — DISCHARGE NOTE OB - FLU SEASON?
Informed that finalized Urine Culture positive for ESBL ecoli. Patient states she has no urinary symptoms or fevers. Advised to monitor for now and follow up as directed w/ primary care doctor and if symptoms of fever occur including but not limited to urinary burning, fever, sweats, lethargy, to come to the ED. Patient understood instructions and pleased with phone call. Yes...

## 2025-03-21 NOTE — OB PST NOTE - PREOP PAIN SCORE
step up/down on curb.   [x] Progressing: [] Met: [] Not Met: [] Adjusted  Patient will return to walking > 10 minutes w/o AD, and without increased symptoms or restriction.   [] Progressing: [x] Met: [] Not Met: [] Adjusted  Patient will be able to go up/down 3 steps reciprocally with single HR in order to navigate home independently and safely.    [] Progressing: [x] Met: [] Not Met: [] Adjusted   Patient will demonstrate decreased edema of R knee to 1cm lower to allow for proper functional mobility and muscle recruitment to enable patient to return to functional knee flexion without stiffness.   [] Progressing: [x] Met: [] Not Met: [] Adjusted       Overall Progression Towards Functional goals/ Treatment Progress Update:  [x] Patient is progressing as expected towards functional goals listed.    [] Progression is slowed due to complexities/Impairments listed.  [] Progression has been slowed due to co-morbidities.  [] Plan just implemented, too soon (<30days) to assess goals progression   [] Goals require adjustment due to lack of progress  [] Patient is not progressing as expected and requires additional follow up with physician  [] Other:     TREATMENT PLAN     Frequency/Duration: 2x/week for  12  weeks    Plan: Cont POC- Continue emphasis/focus on exercise progression, improving proper muscle recruitment and activation/motor control patterns, increasing ROM, and improving soft tissue extensibility. Next visit plan to continue current phase     Electronically Signed by Julio Yao PTA  Date: 03/21/2025     Note: Portions of this note have been templated and/or copied from initial evaluation, reassessments and prior notes for documentation efficiency.    Note: If patient does not return for scheduled/recommended follow up visits, this note will serve as a discharge from care along with the most recent update on progress.      7

## 2025-06-20 ENCOUNTER — APPOINTMENT (OUTPATIENT)
Dept: CARDIOLOGY | Facility: CLINIC | Age: 35
End: 2025-06-20
Payer: COMMERCIAL

## 2025-06-20 VITALS
HEIGHT: 58 IN | SYSTOLIC BLOOD PRESSURE: 122 MMHG | OXYGEN SATURATION: 98 % | BODY MASS INDEX: 31.07 KG/M2 | WEIGHT: 148 LBS | HEART RATE: 74 BPM | DIASTOLIC BLOOD PRESSURE: 82 MMHG

## 2025-06-20 PROCEDURE — 99214 OFFICE O/P EST MOD 30 MIN: CPT | Mod: 25

## 2025-06-20 PROCEDURE — 93000 ELECTROCARDIOGRAM COMPLETE: CPT
